# Patient Record
Sex: MALE | Race: WHITE | HISPANIC OR LATINO | Employment: UNEMPLOYED | URBAN - METROPOLITAN AREA
[De-identification: names, ages, dates, MRNs, and addresses within clinical notes are randomized per-mention and may not be internally consistent; named-entity substitution may affect disease eponyms.]

---

## 2022-01-13 ENCOUNTER — TELEPHONE (OUTPATIENT)
Dept: CT IMAGING | Facility: HOSPITAL | Age: 32
End: 2022-01-13

## 2022-01-13 ENCOUNTER — HOSPITAL ENCOUNTER (EMERGENCY)
Facility: HOSPITAL | Age: 32
Discharge: HOME | End: 2022-01-13
Attending: EMERGENCY MEDICINE
Payer: COMMERCIAL

## 2022-01-13 ENCOUNTER — HOSPITAL ENCOUNTER (EMERGENCY)
Facility: HOSPITAL | Age: 32
Discharge: HOME | End: 2022-01-14
Attending: EMERGENCY MEDICINE
Payer: COMMERCIAL

## 2022-01-13 ENCOUNTER — APPOINTMENT (EMERGENCY)
Dept: CT IMAGING | Facility: HOSPITAL | Age: 32
End: 2022-01-13
Payer: COMMERCIAL

## 2022-01-13 VITALS
HEIGHT: 69 IN | HEART RATE: 78 BPM | RESPIRATION RATE: 16 BRPM | WEIGHT: 160 LBS | BODY MASS INDEX: 23.7 KG/M2 | OXYGEN SATURATION: 98 % | TEMPERATURE: 97.8 F | SYSTOLIC BLOOD PRESSURE: 119 MMHG | DIASTOLIC BLOOD PRESSURE: 65 MMHG

## 2022-01-13 VITALS
RESPIRATION RATE: 22 BRPM | HEART RATE: 76 BPM | DIASTOLIC BLOOD PRESSURE: 61 MMHG | SYSTOLIC BLOOD PRESSURE: 105 MMHG | OXYGEN SATURATION: 99 % | TEMPERATURE: 97.7 F

## 2022-01-13 DIAGNOSIS — R11.0 NAUSEA: Primary | ICD-10-CM

## 2022-01-13 DIAGNOSIS — F12.90 CANNABINOID HYPEREMESIS SYNDROME: ICD-10-CM

## 2022-01-13 DIAGNOSIS — R11.2 CANNABINOID HYPEREMESIS SYNDROME: ICD-10-CM

## 2022-01-13 DIAGNOSIS — R11.2 NAUSEA AND VOMITING, INTRACTABILITY OF VOMITING NOT SPECIFIED, UNSPECIFIED VOMITING TYPE: Primary | ICD-10-CM

## 2022-01-13 LAB
ALBUMIN SERPL BCP-MCNC: 4.4 G/DL (ref 3.5–5)
ALBUMIN SERPL BCP-MCNC: 4.6 G/DL (ref 3.5–5)
ALP SERPL-CCNC: 60 U/L (ref 46–116)
ALP SERPL-CCNC: 64 U/L (ref 46–116)
ALT SERPL W P-5'-P-CCNC: 22 U/L (ref 12–78)
ALT SERPL W P-5'-P-CCNC: 25 U/L (ref 12–78)
ANION GAP SERPL CALCULATED.3IONS-SCNC: 14 MMOL/L (ref 4–13)
ANION GAP SERPL CALCULATED.3IONS-SCNC: 6 MMOL/L (ref 4–13)
AST SERPL W P-5'-P-CCNC: 22 U/L (ref 5–45)
AST SERPL W P-5'-P-CCNC: 27 U/L (ref 5–45)
BACTERIA UR QL AUTO: NORMAL /HPF
BASOPHILS # BLD AUTO: 0.02 THOUSANDS/ΜL (ref 0–0.1)
BASOPHILS # BLD AUTO: 0.05 THOUSANDS/ΜL (ref 0–0.1)
BASOPHILS NFR BLD AUTO: 0 % (ref 0–1)
BASOPHILS NFR BLD AUTO: 1 % (ref 0–1)
BILIRUB SERPL-MCNC: 0.92 MG/DL (ref 0.2–1)
BILIRUB SERPL-MCNC: 1.2 MG/DL (ref 0.2–1)
BILIRUB UR QL STRIP: NEGATIVE
BUN SERPL-MCNC: 11 MG/DL (ref 5–25)
BUN SERPL-MCNC: 14 MG/DL (ref 5–25)
CALCIUM SERPL-MCNC: 10.1 MG/DL (ref 8.3–10.1)
CALCIUM SERPL-MCNC: 9.9 MG/DL (ref 8.3–10.1)
CHLORIDE SERPL-SCNC: 102 MMOL/L (ref 100–108)
CHLORIDE SERPL-SCNC: 107 MMOL/L (ref 100–108)
CLARITY UR: CLEAR
CO2 SERPL-SCNC: 20 MMOL/L (ref 21–32)
CO2 SERPL-SCNC: 24 MMOL/L (ref 21–32)
COLOR UR: YELLOW
CREAT SERPL-MCNC: 0.98 MG/DL (ref 0.6–1.3)
CREAT SERPL-MCNC: 1.14 MG/DL (ref 0.6–1.3)
EOSINOPHIL # BLD AUTO: 0 THOUSAND/ΜL (ref 0–0.61)
EOSINOPHIL # BLD AUTO: 0.16 THOUSAND/ΜL (ref 0–0.61)
EOSINOPHIL NFR BLD AUTO: 0 % (ref 0–6)
EOSINOPHIL NFR BLD AUTO: 2 % (ref 0–6)
ERYTHROCYTE [DISTWIDTH] IN BLOOD BY AUTOMATED COUNT: 11.7 % (ref 11.6–15.1)
ERYTHROCYTE [DISTWIDTH] IN BLOOD BY AUTOMATED COUNT: 11.8 % (ref 11.6–15.1)
FLUAV RNA RESP QL NAA+PROBE: NEGATIVE
FLUBV RNA RESP QL NAA+PROBE: NEGATIVE
GFR SERPL CREATININE-BSD FRML MDRD: 103 ML/MIN/1.73SQ M
GFR SERPL CREATININE-BSD FRML MDRD: 86 ML/MIN/1.73SQ M
GLUCOSE SERPL-MCNC: 113 MG/DL (ref 65–140)
GLUCOSE SERPL-MCNC: 99 MG/DL (ref 65–140)
GLUCOSE UR STRIP-MCNC: NEGATIVE MG/DL
HCT VFR BLD AUTO: 44.9 % (ref 36.5–49.3)
HCT VFR BLD AUTO: 45.7 % (ref 36.5–49.3)
HGB BLD-MCNC: 16.3 G/DL (ref 12–17)
HGB BLD-MCNC: 16.8 G/DL (ref 12–17)
HGB UR QL STRIP.AUTO: NEGATIVE
HOLD SPECIMEN: NORMAL
HYALINE CASTS #/AREA URNS LPF: NORMAL /LPF
IMM GRANULOCYTES # BLD AUTO: 0.03 THOUSAND/UL (ref 0–0.2)
IMM GRANULOCYTES # BLD AUTO: 0.03 THOUSAND/UL (ref 0–0.2)
IMM GRANULOCYTES NFR BLD AUTO: 0 % (ref 0–2)
IMM GRANULOCYTES NFR BLD AUTO: 0 % (ref 0–2)
KETONES UR STRIP-MCNC: ABNORMAL MG/DL
LEUKOCYTE ESTERASE UR QL STRIP: NEGATIVE
LIPASE SERPL-CCNC: 53 U/L (ref 73–393)
LIPASE SERPL-CCNC: 55 U/L (ref 73–393)
LYMPHOCYTES # BLD AUTO: 1.83 THOUSANDS/ΜL (ref 0.6–4.47)
LYMPHOCYTES # BLD AUTO: 2.25 THOUSANDS/ΜL (ref 0.6–4.47)
LYMPHOCYTES NFR BLD AUTO: 17 % (ref 14–44)
LYMPHOCYTES NFR BLD AUTO: 23 % (ref 14–44)
MCH RBC QN AUTO: 31.2 PG (ref 26.8–34.3)
MCH RBC QN AUTO: 31.5 PG (ref 26.8–34.3)
MCHC RBC AUTO-ENTMCNC: 36.3 G/DL (ref 31.4–37.4)
MCHC RBC AUTO-ENTMCNC: 36.8 G/DL (ref 31.4–37.4)
MCV RBC AUTO: 86 FL (ref 82–98)
MCV RBC AUTO: 86 FL (ref 82–98)
MONOCYTES # BLD AUTO: 0.42 THOUSAND/ΜL (ref 0.17–1.22)
MONOCYTES # BLD AUTO: 0.68 THOUSAND/ΜL (ref 0.17–1.22)
MONOCYTES NFR BLD AUTO: 4 % (ref 4–12)
MONOCYTES NFR BLD AUTO: 7 % (ref 4–12)
NEUTROPHILS # BLD AUTO: 6.47 THOUSANDS/ΜL (ref 1.85–7.62)
NEUTROPHILS # BLD AUTO: 8.63 THOUSANDS/ΜL (ref 1.85–7.62)
NEUTS SEG NFR BLD AUTO: 67 % (ref 43–75)
NEUTS SEG NFR BLD AUTO: 79 % (ref 43–75)
NITRITE UR QL STRIP: NEGATIVE
NON-SQ EPI CELLS URNS QL MICRO: NORMAL /HPF
NRBC BLD AUTO-RTO: 0 /100 WBCS
NRBC BLD AUTO-RTO: 0 /100 WBCS
PH UR STRIP.AUTO: 8.5 [PH] (ref 4.5–8)
PLATELET # BLD AUTO: 210 THOUSANDS/UL (ref 149–390)
PLATELET # BLD AUTO: 221 THOUSANDS/UL (ref 149–390)
PMV BLD AUTO: 10.7 FL (ref 8.9–12.7)
PMV BLD AUTO: 9.7 FL (ref 8.9–12.7)
POTASSIUM SERPL-SCNC: 3.6 MMOL/L (ref 3.5–5.3)
POTASSIUM SERPL-SCNC: 3.9 MMOL/L (ref 3.5–5.3)
PROT SERPL-MCNC: 8.5 G/DL (ref 6.4–8.2)
PROT SERPL-MCNC: 8.6 G/DL (ref 6.4–8.2)
PROT UR STRIP-MCNC: ABNORMAL MG/DL
RBC # BLD AUTO: 5.22 MILLION/UL (ref 3.88–5.62)
RBC # BLD AUTO: 5.33 MILLION/UL (ref 3.88–5.62)
RBC #/AREA URNS AUTO: NORMAL /HPF
RSV RNA RESP QL NAA+PROBE: NEGATIVE
SARS-COV-2 RNA RESP QL NAA+PROBE: NEGATIVE
SODIUM SERPL-SCNC: 136 MMOL/L (ref 136–145)
SODIUM SERPL-SCNC: 137 MMOL/L (ref 136–145)
SP GR UR STRIP.AUTO: 1.01 (ref 1–1.03)
UROBILINOGEN UR QL STRIP.AUTO: 0.2 E.U./DL
WBC # BLD AUTO: 10.93 THOUSAND/UL (ref 4.31–10.16)
WBC # BLD AUTO: 9.64 THOUSAND/UL (ref 4.31–10.16)
WBC #/AREA URNS AUTO: NORMAL /HPF

## 2022-01-13 PROCEDURE — 85025 COMPLETE CBC W/AUTO DIFF WBC: CPT | Performed by: EMERGENCY MEDICINE

## 2022-01-13 PROCEDURE — 74177 CT ABD & PELVIS W/CONTRAST: CPT

## 2022-01-13 PROCEDURE — 83690 ASSAY OF LIPASE: CPT | Performed by: EMERGENCY MEDICINE

## 2022-01-13 PROCEDURE — 36415 COLL VENOUS BLD VENIPUNCTURE: CPT | Performed by: EMERGENCY MEDICINE

## 2022-01-13 PROCEDURE — 96374 THER/PROPH/DIAG INJ IV PUSH: CPT

## 2022-01-13 PROCEDURE — 96375 TX/PRO/DX INJ NEW DRUG ADDON: CPT

## 2022-01-13 PROCEDURE — 96365 THER/PROPH/DIAG IV INF INIT: CPT

## 2022-01-13 PROCEDURE — 99284 EMERGENCY DEPT VISIT MOD MDM: CPT

## 2022-01-13 PROCEDURE — G1004 CDSM NDSC: HCPCS

## 2022-01-13 PROCEDURE — 80053 COMPREHEN METABOLIC PANEL: CPT | Performed by: EMERGENCY MEDICINE

## 2022-01-13 PROCEDURE — 0241U HB NFCT DS VIR RESP RNA 4 TRGT: CPT | Performed by: PHYSICIAN ASSISTANT

## 2022-01-13 PROCEDURE — 99284 EMERGENCY DEPT VISIT MOD MDM: CPT | Performed by: PHYSICIAN ASSISTANT

## 2022-01-13 PROCEDURE — 99284 EMERGENCY DEPT VISIT MOD MDM: CPT | Performed by: EMERGENCY MEDICINE

## 2022-01-13 PROCEDURE — 81001 URINALYSIS AUTO W/SCOPE: CPT

## 2022-01-13 PROCEDURE — 96361 HYDRATE IV INFUSION ADD-ON: CPT

## 2022-01-13 RX ORDER — ONDANSETRON 2 MG/ML
4 INJECTION INTRAMUSCULAR; INTRAVENOUS ONCE
Status: COMPLETED | OUTPATIENT
Start: 2022-01-13 | End: 2022-01-13

## 2022-01-13 RX ORDER — HALOPERIDOL 5 MG/ML
2 INJECTION INTRAMUSCULAR ONCE
Status: DISCONTINUED | OUTPATIENT
Start: 2022-01-13 | End: 2022-01-13

## 2022-01-13 RX ORDER — ONDANSETRON 4 MG/1
4 TABLET, ORALLY DISINTEGRATING ORAL ONCE
Status: COMPLETED | OUTPATIENT
Start: 2022-01-13 | End: 2022-01-13

## 2022-01-13 RX ORDER — METOCLOPRAMIDE HYDROCHLORIDE 5 MG/ML
10 INJECTION INTRAMUSCULAR; INTRAVENOUS ONCE
Status: COMPLETED | OUTPATIENT
Start: 2022-01-13 | End: 2022-01-13

## 2022-01-13 RX ORDER — HALOPERIDOL 5 MG/ML
2 INJECTION INTRAMUSCULAR ONCE
Status: COMPLETED | OUTPATIENT
Start: 2022-01-13 | End: 2022-01-13

## 2022-01-13 RX ORDER — DEXTROSE MONOHYDRATE 50 MG/ML
1000 INJECTION, SOLUTION INTRAVENOUS CONTINUOUS
Status: DISCONTINUED | OUTPATIENT
Start: 2022-01-13 | End: 2022-01-13

## 2022-01-13 RX ORDER — ONDANSETRON 4 MG/1
4 TABLET, FILM COATED ORAL EVERY 6 HOURS
Qty: 12 TABLET | Refills: 0 | Status: SHIPPED | OUTPATIENT
Start: 2022-01-13

## 2022-01-13 RX ORDER — CALCIUM CARBONATE 200(500)MG
500 TABLET,CHEWABLE ORAL DAILY PRN
Status: DISCONTINUED | OUTPATIENT
Start: 2022-01-13 | End: 2022-01-13 | Stop reason: HOSPADM

## 2022-01-13 RX ORDER — LIDOCAINE HYDROCHLORIDE 20 MG/ML
15 SOLUTION OROPHARYNGEAL ONCE
Status: COMPLETED | OUTPATIENT
Start: 2022-01-13 | End: 2022-01-13

## 2022-01-13 RX ORDER — IBUPROFEN 400 MG/1
400 TABLET ORAL ONCE
Status: COMPLETED | OUTPATIENT
Start: 2022-01-13 | End: 2022-01-13

## 2022-01-13 RX ORDER — MAGNESIUM HYDROXIDE/ALUMINUM HYDROXICE/SIMETHICONE 120; 1200; 1200 MG/30ML; MG/30ML; MG/30ML
30 SUSPENSION ORAL ONCE
Status: COMPLETED | OUTPATIENT
Start: 2022-01-13 | End: 2022-01-13

## 2022-01-13 RX ADMIN — IOHEXOL 100 ML: 350 INJECTION, SOLUTION INTRAVENOUS at 22:13

## 2022-01-13 RX ADMIN — SODIUM CHLORIDE 1000 ML: 0.9 INJECTION, SOLUTION INTRAVENOUS at 07:01

## 2022-01-13 RX ADMIN — DEXTROSE AND SODIUM CHLORIDE 1000 ML: 5; .9 INJECTION, SOLUTION INTRAVENOUS at 07:42

## 2022-01-13 RX ADMIN — LIDOCAINE HYDROCHLORIDE 15 ML: 20 SOLUTION ORAL; TOPICAL at 07:31

## 2022-01-13 RX ADMIN — METOCLOPRAMIDE HYDROCHLORIDE 10 MG: 5 INJECTION INTRAMUSCULAR; INTRAVENOUS at 21:42

## 2022-01-13 RX ADMIN — ALUMINA, MAGNESIA, AND SIMETHICONE ORAL SUSPENSION REGULAR STRENGTH 30 ML: 1200; 1200; 120 SUSPENSION ORAL at 07:31

## 2022-01-13 RX ADMIN — ONDANSETRON 4 MG: 4 TABLET, ORALLY DISINTEGRATING ORAL at 19:55

## 2022-01-13 RX ADMIN — SODIUM CHLORIDE 1000 ML: 0.9 INJECTION, SOLUTION INTRAVENOUS at 21:38

## 2022-01-13 RX ADMIN — IBUPROFEN 400 MG: 400 TABLET, FILM COATED ORAL at 19:55

## 2022-01-13 RX ADMIN — ONDANSETRON 4 MG: 2 INJECTION INTRAMUSCULAR; INTRAVENOUS at 23:46

## 2022-01-13 RX ADMIN — HALOPERIDOL LACTATE 2 MG: 5 INJECTION, SOLUTION INTRAMUSCULAR at 07:00

## 2022-01-13 RX ADMIN — FAMOTIDINE 20 MG: 10 INJECTION INTRAVENOUS at 07:41

## 2022-01-13 NOTE — ED NOTES
Pt given crackers and water by resident  Pt began vomiting and dry heaving again  Pt advised to stop eating  Resident updated  Advised same  No new orders       Soniya Palacios RN  01/13/22 0676

## 2022-01-13 NOTE — DISCHARGE INSTRUCTIONS
Stop smoking marijuana  Take the Zofran for your nausea  You can also take hot showers and use capsicin cream      Use Tylenol to treat your abdominal pain

## 2022-01-13 NOTE — ED PROVIDER NOTES
History  Chief Complaint   Patient presents with    Abdominal Pain     pt states that he has abdominal pain since yesterday, has not been taking anything down; per EMS they said patient states he has alot going on in his personal life since pau and has not been eating, drinking, or sleeping     Ashkan Granados is a 36year-old man with a medical history significant for bipolar disorder presenting with abdominal pain, nausea, vomiting  These symptoms started 3-4 days ago and then severely worsened this morning  He has not eaten or drank much over the last few days  He smokes marijuana on a daily basis  No other recreational drugs, alcohol, or new medications  Has not taken anything for his symptoms at home  No blood in stool, melena, diarrhea, blood in vomit, fevers, chills, cough, congestion, rhinorrhea, chest pain, shortness of breath, history of intraabdominal surgeries, history of nephrolithiasis  None       Past Medical History:   Diagnosis Date    Asthma     Bipolar disorder ([facility] [address] )        Past Surgical History:   Procedure Laterality Date    APPENDECTOMY      TONSILLECTOMY         History reviewed  No pertinent family history  I have reviewed and agree with the history as documented  E-Cigarette/Vaping     E-Cigarette/Vaping Substances     Social History     Tobacco Use    Smoking status: Current Every Day Smoker     Packs/day: 0 25     Types: Cigarettes    Smokeless tobacco: Never Used   Substance Use Topics    Alcohol use: Yes    Drug use: Yes     Types: Marijuana        Review of Systems   All other systems reviewed and are negative        Physical Exam  ED Triage Vitals [01/13/22 0635]   Temperature Pulse Respirations Blood Pressure SpO2   97 7 °F (36 5 °C) 69 22 151/77 99 %      Temp Source Heart Rate Source Patient Position - Orthostatic VS BP Location FiO2 (%)   Oral Monitor Lying Left arm --      Pain Score       --             Orthostatic Vital Signs  Vitals:    01/13/22 0800 01/13/22 0815 01/13/22 0830 01/13/22 0900   BP: 121/78  126/82 105/61   Pulse: 64 76     Patient Position - Orthostatic VS:           Physical Exam  Vitals and nursing note reviewed  Constitutional:       General: He is not in acute distress  Appearance: He is ill-appearing and diaphoretic  HENT:      Head: Normocephalic and atraumatic  Right Ear: External ear normal       Left Ear: External ear normal       Mouth/Throat:      Mouth: Mucous membranes are moist    Eyes:      General: No scleral icterus  Cardiovascular:      Rate and Rhythm: Normal rate and regular rhythm  Pulmonary:      Effort: Pulmonary effort is normal       Breath sounds: Normal breath sounds  Abdominal:      General: There is no distension  Palpations: Abdomen is soft  Tenderness: There is generalized abdominal tenderness  There is no right CVA tenderness, left CVA tenderness or guarding  Negative signs include Marinelli's sign  Musculoskeletal:         General: No deformity  Skin:     General: Skin is warm  Capillary Refill: Capillary refill takes less than 2 seconds  Coloration: Skin is not jaundiced or pale  Neurological:      General: No focal deficit present  Mental Status: He is alert and oriented to person, place, and time           ED Medications  Medications   haloperidol lactate (HALDOL) injection 2 mg (2 mg Intravenous Given 1/13/22 0700)   aluminum-magnesium hydroxide-simethicone (MYLANTA) oral suspension 30 mL (30 mL Oral Given 1/13/22 0731)   Lidocaine Viscous HCl (XYLOCAINE) 2 % mucosal solution 15 mL (15 mL Swish & Spit Given 1/13/22 0731)   famotidine (PEPCID) injection 20 mg (20 mg Intravenous Given 1/13/22 0741)   dextrose 5 % and sodium chloride 0 9 % bolus 1,000 mL (0 mL Intravenous Stopped 1/13/22 0900)       Diagnostic Studies  Results Reviewed     Procedure Component Value Units Date/Time    Urine Microscopic [[id number]]  (Normal) Collected: 01/13/22 0737    Lab Status: Final result Specimen: Urine, Clean Catch Updated: 01/13/22 0855     RBC, UA None Seen /hpf      WBC, UA None Seen /hpf      Epithelial Cells None Seen /hpf      Bacteria, UA None Seen /hpf      Hyaline Casts, UA None Seen /lpf     Comprehensive metabolic panel [[id number]]  (Abnormal) Collected: 01/13/22 0731    Lab Status: Final result Specimen: Blood from Arm, Right Updated: 01/13/22 0804     Sodium 137 mmol/L      Potassium 3 6 mmol/L      Chloride 107 mmol/L      CO2 24 mmol/L      ANION GAP 6 mmol/L      BUN 14 mg/dL      Creatinine 0 98 mg/dL      Glucose 99 mg/dL      Calcium 9 9 mg/dL      AST 22 U/L      ALT 25 U/L      Alkaline Phosphatase 60 U/L      Total Protein 8 6 g/dL      Albumin 4 4 g/dL      Total Bilirubin 0 92 mg/dL      eGFR 103 ml/min/1 73sq m     Narrative:      [place] guidelines for Chronic Kidney Disease (CKD):     Stage 1 with normal or high GFR (GFR > 90 mL/min/1 73 square meters)    Stage 2 Mild CKD (GFR = 60-89 mL/min/1 73 square meters)    Stage 3A Moderate CKD (GFR = 45-59 mL/min/1 73 square meters)    Stage 3B Moderate CKD (GFR = 30-44 mL/min/1 73 square meters)    Stage 4 Severe CKD (GFR = 15-29 mL/min/1 73 square meters)    Stage 5 End Stage CKD (GFR <15 mL/min/1 73 square meters)  Note: GFR calculation is accurate only with a steady state creatinine    Lipase [[id number]]  (Abnormal) Collected: 01/13/22 0731    Lab Status: Final result Specimen: Blood from Arm, Right Updated: 01/13/22 0804     Lipase 53 u/L     CBC and differential [[id number]] Collected: 01/13/22 0731    Lab Status: Final result Specimen: Blood from Arm, Right Updated: 01/13/22 0743     WBC 9 64 Thousand/uL      RBC 5 22 Million/uL      Hemoglobin 16 3 g/dL      Hematocrit 44 9 %      MCV 86 fL      MCH 31 2 pg      MCHC 36 3 g/dL      RDW 11 8 %      MPV 10 7 fL      Platelets 951 Thousands/uL      nRBC 0 /100 WBCs      Neutrophils Relative 67 %      Immat GRANS % 0 % Lymphocytes Relative 23 %      Monocytes Relative 7 %      Eosinophils Relative 2 %      Basophils Relative 1 %      Neutrophils Absolute 6 47 Thousands/µL      Immature Grans Absolute 0 03 Thousand/uL      Lymphocytes Absolute 2 25 Thousands/µL      Monocytes Absolute 0 68 Thousand/µL      Eosinophils Absolute 0 16 Thousand/µL      Basophils Absolute 0 05 Thousands/µL     Urine Macroscopic, POC [[id number]]  (Abnormal) Collected: 01/13/22 0737    Lab Status: Final result Specimen: Urine Updated: 01/13/22 0739     Color, UA Yellow     Clarity, UA Clear     pH, UA 8 5     Leukocytes, UA Negative     Nitrite, UA Negative     Protein, UA 30 (1+) mg/dl      Glucose, UA Negative mg/dl      Ketones, UA 15 (1+) mg/dl      Urobilinogen, UA 0 2 E U /dl      Bilirubin, UA Negative     Blood, UA Negative     Specific Gravity, UA 1 015    Narrative:      [facility] RESULT                 No orders to display         Procedures  Procedures      ED Course                             SBIRT 22yo+      Most Recent Value   SBIRT (24 yo +)    In order to provide better care to our patients, we are screening all of our patients for alcohol and drug use  Would it be okay to ask you these screening questions? Unable to answer at this time Filed at: 01/13/2022 1646                [facility]  Number of Diagnoses or Management Options  Cannabinoid hyperemesis syndrome  Nausea  Diagnosis management comments: 37 yo man presenting with vomiting  Most concerning for hyperemesis due to cannabis  Also concerning for cholecystitis, pancreatitis, gastritis, gastric ulcer, nephrolithiasis  Will evaluate with CBC, CMP, lipase, UA  Will treat with IV D5 NS bolus, haldol, tums, mylanta, viscous lidocaine, pepcid  Symptoms improved in the ED  Lab work unremarkable  Most likely hyperemesis due to cannabis  Counseled on marijuana cessation  Discharged home, to follow up with PCP         Disposition  Final diagnoses:   Nausea   Cannabinoid hyperemesis syndrome Time reflects when diagnosis was documented in both MDM as applicable and the Disposition within this note     Time User Action Codes Description Comment    1/13/2022  8:21 AM Ben Davidson Add [R11 0] Nausea     1/13/2022  8:23 AM Ben Davidson Add [R11 2,  F12 90] Cannabinoid hyperemesis syndrome       ED Disposition     ED Disposition Condition Date/Time Comment    Discharge Stable Thu Jan 13, 2022  9:20 AM Collette Ket discharge to home/self care  Follow-up Information     Follow up With Specialties Details Why Contact Info Additional Information    [place] In 1 week  [address] [id number]  [id number]  , [place], [place], [address]          Discharge Medication List as of 1/13/2022  9:20 AM      START taking these medications    Details   ondansetron (ZOFRAN) 4 mg tablet Take 1 tablet (4 mg total) by mouth every 6 (six) hours, Starting Thu 1/13/2022, Normal           No discharge procedures on file  PDMP Review     None           ED Provider  Attending physically available and evaluated Collette Ket  I managed the patient along with the ED Attending      Electronically Signed by         Delfino Hairston MD  01/30/22 6648

## 2022-01-13 NOTE — ED ATTENDING ATTESTATION
1/13/2022  IYefri MD, saw and evaluated the patient  I have discussed the patient with the resident/non-physician practitioner and agree with the resident's/non-physician practitioner's findings, Plan of Care, and MDM as documented in the resident's/non-physician practitioner's note, except where noted  All available labs and Radiology studies were reviewed  I was present for key portions of any procedure(s) performed by the resident/non-physician practitioner and I was immediately available to provide assistance  At this point I agree with the current assessment done in the Emergency Department    I have conducted an independent evaluation of this patient a history and physical is as follows:  abd pain with nausea and vomiting  Non bilious non bloody  No diarrhea   No melena no urine  Symptoms   No fever   No back pain   Exam  nad anicteric    Lungs clear heart rrr no m abd soft  nd  Pos bs  Mild diffuse tenderness  Ext normal     ED Course         Critical Care Time  Procedures

## 2022-01-13 NOTE — Clinical Note
Meir Beti was seen and treated in our emergency department on 1/13/2022  Diagnosis:     Ron Conrad  may return to work on return date  He may return on this date: 01/15/2022         If you have any questions or concerns, please don't hesitate to call        Patrick Figueroa MD    ______________________________           _______________          _______________  Hospital Representative                              Date                                Time

## 2022-01-14 LAB
BACTERIA UR QL AUTO: NORMAL /HPF
BILIRUB UR QL STRIP: NEGATIVE
CLARITY UR: CLEAR
COLOR UR: YELLOW
GLUCOSE UR STRIP-MCNC: NEGATIVE MG/DL
HGB UR QL STRIP.AUTO: ABNORMAL
KETONES UR STRIP-MCNC: ABNORMAL MG/DL
LEUKOCYTE ESTERASE UR QL STRIP: NEGATIVE
NITRITE UR QL STRIP: NEGATIVE
NON-SQ EPI CELLS URNS QL MICRO: NORMAL /HPF
PH UR STRIP.AUTO: 8.5 [PH]
PROT UR STRIP-MCNC: NEGATIVE MG/DL
RBC #/AREA URNS AUTO: NORMAL /HPF
SP GR UR STRIP.AUTO: 1.01 (ref 1–1.03)
UROBILINOGEN UR QL STRIP.AUTO: 0.2 E.U./DL
WBC #/AREA URNS AUTO: NORMAL /HPF

## 2022-01-14 PROCEDURE — 81001 URINALYSIS AUTO W/SCOPE: CPT | Performed by: PHYSICIAN ASSISTANT

## 2022-01-14 RX ORDER — METOCLOPRAMIDE 10 MG/1
10 TABLET ORAL EVERY 6 HOURS
Qty: 15 TABLET | Refills: 0 | Status: SHIPPED | OUTPATIENT
Start: 2022-01-14

## 2022-01-14 NOTE — ED PROVIDER NOTES
History  Chief Complaint   Patient presents with    Abdominal Pain     Patient comes in to ED w/ c/o abd pain and vomiting x2 days  Patient was seen at [facility] ED this morning and prescribed zofran, reports no relief   Vomiting     Patient is a 74-year-old male presenting to the emergency room for evaluation of vomiting for the last 3 days  Patient was seen in another emergency room earlier in the day and was discharged home with diagnosis of hyperemesis cannabinoid  Patient states he got home and he began vomiting again which prompted him to come to this emergency room  Patient denies any fever, chills, chest pain, shortness of breath, dysuria, testicular pain, diarrhea, headache, dizziness  Patient states he does smoke marijuana daily  History provided by:  Patient   used: No    Vomiting  Associated symptoms: no abdominal pain, no arthralgias, no chills, no cough, no fever and no sore throat        Prior to Admission Medications   Prescriptions Last Dose Informant Patient Reported? Taking?   ondansetron (ZOFRAN) 4 mg tablet   No No   Sig: Take 1 tablet (4 mg total) by mouth every 6 (six) hours      Facility-Administered Medications: None       Past Medical History:   Diagnosis Date    Asthma     Bipolar disorder ([facility] [address] )        Past Surgical History:   Procedure Laterality Date    APPENDECTOMY      TONSILLECTOMY         History reviewed  No pertinent family history  I have reviewed and agree with the history as documented  E-Cigarette/Vaping     E-Cigarette/Vaping Substances     Social History     Tobacco Use    Smoking status: Current Every Day Smoker     Packs/day: 0 25     Types: Cigarettes    Smokeless tobacco: Never Used   Substance Use Topics    Alcohol use: Yes    Drug use: Yes     Types: Marijuana       Review of Systems   Constitutional: Negative for chills and fever  HENT: Negative for ear pain and sore throat      Eyes: Negative for pain and visual disturbance  Respiratory: Negative for cough and shortness of breath  Cardiovascular: Negative for chest pain and palpitations  Gastrointestinal: Positive for nausea and vomiting  Negative for abdominal pain  Genitourinary: Negative for dysuria and hematuria  Musculoskeletal: Negative for arthralgias and back pain  Skin: Negative for color change and rash  Neurological: Negative for seizures and syncope  All other systems reviewed and are negative  Physical Exam  Physical Exam  Vitals and nursing note reviewed  Constitutional:       Appearance: He is well-developed  HENT:      Head: Normocephalic and atraumatic  Eyes:      Conjunctiva/sclera: Conjunctivae normal    Cardiovascular:      Rate and Rhythm: Normal rate and regular rhythm  Heart sounds: No murmur heard  Pulmonary:      Effort: Pulmonary effort is normal  No respiratory distress  Breath sounds: Normal breath sounds  Abdominal:      Palpations: Abdomen is soft  Tenderness: There is generalized abdominal tenderness  Musculoskeletal:      Cervical back: Neck supple  Skin:     General: Skin is warm and dry  Neurological:      Mental Status: He is alert           Vital Signs  ED Triage Vitals [01/13/22 1948]   Temperature Pulse Respirations Blood Pressure SpO2   97 8 °F (36 6 °C) 100 20 130/67 100 %      Temp Source Heart Rate Source Patient Position - Orthostatic VS BP Location FiO2 (%)   Oral Monitor Sitting Left arm --      Pain Score       8           Vitals:    01/13/22 1948 01/13/22 2350   BP: 130/67 119/65   Pulse: 100 78   Patient Position - Orthostatic VS: Sitting        ED Medications  Medications   ondansetron (ZOFRAN-ODT) dispersible tablet 4 mg (4 mg Oral Given 1/13/22 1955)   ibuprofen (MOTRIN) tablet 400 mg (400 mg Oral Given 1/13/22 1955)   sodium chloride 0 9 % bolus 1,000 mL (0 mL Intravenous Stopped 1/14/22 0050)   metoclopramide (REGLAN) injection 10 mg (10 mg Intravenous Given 1/13/22 2142)   iohexol (OMNIPAQUE) 350 MG/ML injection (SINGLE-DOSE) 100 mL (100 mL Intravenous Given 1/13/22 2213)   ondansetron (ZOFRAN) injection 4 mg (4 mg Intravenous Given 1/13/22 2346)       Diagnostic Studies  Results Reviewed     Procedure Component Value Units Date/Time    Urine Microscopic [[id number]]  (Normal) Collected: 01/14/22 0004    Lab Status: Final result Specimen: Urine Updated: 01/14/22 0111     RBC, UA 1-2 /hpf      WBC, UA 0-1 /hpf      Epithelial Cells Occasional /hpf      Bacteria, UA None Seen /hpf     UA (URINE) with reflex to Scope [[id number]]  (Abnormal) Collected: 01/14/22 0004    Lab Status: Final result Specimen: Urine Updated: 01/14/22 0008     Color, UA Yellow     Clarity, UA Clear     Specific Gravity, UA 1 010     pH, UA 8 5     Leukocytes, UA Negative     Nitrite, UA Negative     Protein, UA Negative mg/dl      Glucose, UA Negative mg/dl      Ketones, UA 40 (2+) mg/dl      Urobilinogen, UA 0 2 E U /dl      Bilirubin, UA Negative     Blood, UA Trace-Intact    COVID/FLU/RSV [[id number]]  (Normal) Collected: 01/13/22 2138    Lab Status: Final result Specimen: Nares from Nose Updated: 01/13/22 2225     SARS-CoV-2 Negative     INFLUENZA A PCR Negative     INFLUENZA B PCR Negative     RSV PCR Negative    Narrative:      FOR PEDIATRIC PATIENTS - copy/paste COVID Guidelines URL to browser: [email or web address]  [employer]    SARS-CoV-2 assay is a Nucleic Acid Amplification assay intended for the  qualitative detection of nucleic acid from SARS-CoV-2 in nasopharyngeal  swabs  Results are for the presumptive identification of SARS-CoV-2 RNA  Positive results are indicative of infection with SARS-CoV-2, the virus  causing COVID-19, but do not rule out bacterial infection or co-infection  with other viruses  Laboratories within the [place] and its  territories are required to report all positive results to the appropriate  public health authorities  Negative results do not preclude SARS-CoV-2  infection and should not be used as the sole basis for treatment or other  patient management decisions  Negative results must be combined with  clinical observations, patient history, and epidemiological information  This test has not been FDA cleared or approved  This test has been authorized by FDA under an Emergency Use Authorization  (EUA)  This test is only authorized for the duration of time the  declaration that circumstances exist justifying the authorization of the  emergency use of an in vitro diagnostic tests for detection of SARS-CoV-2  virus and/or diagnosis of COVID-19 infection under section 564(b)(1) of  the Act, 21 U  S C  688XGG-4(P)(2), unless the authorization is terminated  or revoked sooner  The test has been validated but independent review by FDA  and CLIA is pending  Test performed using [employer] GeneXpert: This RT-PCR assay targets N2,  a region unique to SARS-CoV-2  A conserved region in the E-gene was chosen  for pan-Sarbecovirus detection which includes SARS-CoV-2  [place] draw [[id number]] Collected: 01/13/22 1957    Lab Status: In process Specimen: Blood from Arm, Right Updated: 01/13/22 2101    Narrative: The following orders were created for panel order [place] draw  Procedure                               Abnormality         Status                     ---------                               -----------         ------                     Nathanael Rod Top on UFSP[[id number]]                           Final result               Green / Black tube on BJUT[[id number]]                       Final result               Lavender Top 7ml on RPUX[[id number]]                         In process                   Please view results for these tests on the individual orders      Lipase [[id number]]  (Abnormal) Collected: 01/13/22 1955    Lab Status: Final result Specimen: Blood from Arm, Right Updated: 01/13/22 2038     Lipase 55 u/L Comprehensive metabolic panel [[id number]]  (Abnormal) Collected: 01/13/22 1955    Lab Status: Final result Specimen: Blood from Arm, Right Updated: 01/13/22 2038     Sodium 136 mmol/L      Potassium 3 9 mmol/L      Chloride 102 mmol/L      CO2 20 mmol/L      ANION GAP 14 mmol/L      BUN 11 mg/dL      Creatinine 1 14 mg/dL      Glucose 113 mg/dL      Calcium 10 1 mg/dL      AST 27 U/L      ALT 22 U/L      Alkaline Phosphatase 64 U/L      Total Protein 8 5 g/dL      Albumin 4 6 g/dL      Total Bilirubin 1 20 mg/dL      eGFR 86 ml/min/1 73sq m     Narrative:      National Kidney Disease Foundation guidelines for Chronic Kidney Disease (CKD):     Stage 1 with normal or high GFR (GFR > 90 mL/min/1 73 square meters)    Stage 2 Mild CKD (GFR = 60-89 mL/min/1 73 square meters)    Stage 3A Moderate CKD (GFR = 45-59 mL/min/1 73 square meters)    Stage 3B Moderate CKD (GFR = 30-44 mL/min/1 73 square meters)    Stage 4 Severe CKD (GFR = 15-29 mL/min/1 73 square meters)    Stage 5 End Stage CKD (GFR <15 mL/min/1 73 square meters)  Note: GFR calculation is accurate only with a steady state creatinine    CBC and differential [[id number]]  (Abnormal) Collected: 01/13/22 1955    Lab Status: Final result Specimen: Blood from Arm, Right Updated: 01/13/22 2003     WBC 10 93 Thousand/uL      RBC 5 33 Million/uL      Hemoglobin 16 8 g/dL      Hematocrit 45 7 %      MCV 86 fL      MCH 31 5 pg      MCHC 36 8 g/dL      RDW 11 7 %      MPV 9 7 fL      Platelets 546 Thousands/uL      nRBC 0 /100 WBCs      Neutrophils Relative 79 %      Immat GRANS % 0 %      Lymphocytes Relative 17 %      Monocytes Relative 4 %      Eosinophils Relative 0 %      Basophils Relative 0 %      Neutrophils Absolute 8 63 Thousands/µL      Immature Grans Absolute 0 03 Thousand/uL      Lymphocytes Absolute 1 83 Thousands/µL      Monocytes Absolute 0 42 Thousand/µL      Eosinophils Absolute 0 00 Thousand/µL      Basophils Absolute 0 02 Thousands/µL CT abdomen pelvis with contrast   Final Result by Vick Kumar MD (01/13 9961)      No evidence of acute abnormality  Workstation performed: [id number]                  ED Course  ED Course as of 01/14/22 0611   Geo Mendoza Jan 14, 2022   0030 Re-evaluation at bedside, patient feels better, would like to go home  Updated patient on labs and imaging results  Counseling as below  The appropriate recommended follow up and warning signs for return to the nearest ED were explained  patient's questions answered; patient is competent and reliable, verbalized understanding of all that was explained, and agrees with the disposition and further plan of treatment  patient was additionally provided with detailed follow up care instructions, phone numbers to call within our institution for other concerns or inquiries  Advised to follow up with PCP  Pt is in no acute distress and is stable for discharge at this time  MDM  Number of Diagnoses or Management Options  Nausea and vomiting, intractability of vomiting not specified, unspecified vomiting type  Diagnosis management comments: Patient is a 31-year-old male presenting to the emergency room for evaluation of vomiting for 3 days  Patient was seen in another emergency room earlier in the day and was discharged home  Patient states he does smoke marijuana daily  On exam patient's abdomen is diffusely tender, however it is mild  Patient CBCs revealing a minimal leukocytosis  Patient's CMP a CO2 of 20, and an anion gap of 14, likely related to dehydration  His sugars are within normal limits  Patient's kidney function is within normal limits  Patient's urinalysis positive for 2+ ketones, likely related to dehydration  Patient was given fluid boluses, Reglan, and Zofran in the emergency room  Patient is able to tolerate p o  Juice in the emergency room  Patient had a CT done which was unremarkable for any acute abnormalities    Patient was given a script for Reglan, he was advised return to the ER if anything acutely changes  Amount and/or Complexity of Data Reviewed  Clinical lab tests: ordered and reviewed  Tests in the radiology section of CPT®: ordered and reviewed    Patient Progress  Patient progress: stable      Disposition  Final diagnoses:   Nausea and vomiting, intractability of vomiting not specified, unspecified vomiting type     Time reflects when diagnosis was documented in both MDM as applicable and the Disposition within this note     Time User Action Codes Description Comment    1/14/2022 12:40 AM Kylah Sheets Add [R11 2] Nausea and vomiting, intractability of vomiting not specified, unspecified vomiting type       ED Disposition     ED Disposition Condition Date/Time Comment    Discharge Stable Fri Jan 14, 2022 12:40 AM Thersia Shown discharge to home/self care  Follow-up Information     Follow up With Specialties Details Why Contact Info Additional Information    [facility] Emergency Medicine  If symptoms worsen [address] Emergency Department, [address], [place], [place], [address]          Discharge Medication List as of 1/14/2022 12:41 AM      START taking these medications    Details   metoclopramide (Reglan) 10 mg tablet Take 1 tablet (10 mg total) by mouth every 6 (six) hours, Starting Fri 1/14/2022, Print         CONTINUE these medications which have NOT CHANGED    Details   ondansetron (ZOFRAN) 4 mg tablet Take 1 tablet (4 mg total) by mouth every 6 (six) hours, Starting Thu 1/13/2022, Normal             No discharge procedures on file      PDMP Review     None          ED Provider  Electronically Signed by           Orlando Coffey PA-C  01/14/22 8042

## 2022-08-13 ENCOUNTER — HOSPITAL ENCOUNTER (EMERGENCY)
Facility: HOSPITAL | Age: 32
Discharge: HOME | End: 2022-08-13
Attending: EMERGENCY MEDICINE
Payer: COMMERCIAL

## 2022-08-13 DIAGNOSIS — F12.10 MILD TETRAHYDROCANNABINOL (THC) ABUSE: ICD-10-CM

## 2022-08-13 DIAGNOSIS — R11.2 NAUSEA AND VOMITING, UNSPECIFIED VOMITING TYPE: Primary | ICD-10-CM

## 2022-08-13 LAB
ALBUMIN SERPL BCP-MCNC: 4.7 G/DL (ref 3.5–5)
ALP SERPL-CCNC: 63 U/L (ref 34–104)
ALT SERPL W P-5'-P-CCNC: 14 U/L (ref 7–52)
ANION GAP SERPL CALCULATED.3IONS-SCNC: 13 MMOL/L (ref 4–13)
AST SERPL W P-5'-P-CCNC: 16 U/L (ref 13–39)
BASOPHILS # BLD AUTO: 0.04 THOUSANDS/ΜL (ref 0–0.1)
BASOPHILS NFR BLD AUTO: 0 % (ref 0–1)
BILIRUB SERPL-MCNC: 1.37 MG/DL (ref 0.2–1)
BUN SERPL-MCNC: 13 MG/DL (ref 5–25)
CALCIUM SERPL-MCNC: 10.3 MG/DL (ref 8.4–10.2)
CHLORIDE SERPL-SCNC: 95 MMOL/L (ref 96–108)
CO2 SERPL-SCNC: 28 MMOL/L (ref 21–32)
CREAT SERPL-MCNC: 1.03 MG/DL (ref 0.6–1.3)
EOSINOPHIL # BLD AUTO: 0.02 THOUSAND/ΜL (ref 0–0.61)
EOSINOPHIL NFR BLD AUTO: 0 % (ref 0–6)
ERYTHROCYTE [DISTWIDTH] IN BLOOD BY AUTOMATED COUNT: 11.2 % (ref 11.6–15.1)
GFR SERPL CREATININE-BSD FRML MDRD: 97 ML/MIN/1.73SQ M
GLUCOSE SERPL-MCNC: 127 MG/DL (ref 65–140)
HCT VFR BLD AUTO: 45.6 % (ref 36.5–49.3)
HGB BLD-MCNC: 17 G/DL (ref 12–17)
IMM GRANULOCYTES # BLD AUTO: 0.03 THOUSAND/UL (ref 0–0.2)
IMM GRANULOCYTES NFR BLD AUTO: 0 % (ref 0–2)
LIPASE SERPL-CCNC: 16 U/L (ref 11–82)
LYMPHOCYTES # BLD AUTO: 1.51 THOUSANDS/ΜL (ref 0.6–4.47)
LYMPHOCYTES NFR BLD AUTO: 16 % (ref 14–44)
MCH RBC QN AUTO: 30.7 PG (ref 26.8–34.3)
MCHC RBC AUTO-ENTMCNC: 37.3 G/DL (ref 31.4–37.4)
MCV RBC AUTO: 83 FL (ref 82–98)
MONOCYTES # BLD AUTO: 0.78 THOUSAND/ΜL (ref 0.17–1.22)
MONOCYTES NFR BLD AUTO: 8 % (ref 4–12)
NEUTROPHILS # BLD AUTO: 7.16 THOUSANDS/ΜL (ref 1.85–7.62)
NEUTS SEG NFR BLD AUTO: 76 % (ref 43–75)
NRBC BLD AUTO-RTO: 0 /100 WBCS
PLATELET # BLD AUTO: 226 THOUSANDS/UL (ref 149–390)
PMV BLD AUTO: 9 FL (ref 8.9–12.7)
POTASSIUM SERPL-SCNC: 2.8 MMOL/L (ref 3.5–5.3)
PROT SERPL-MCNC: 8.3 G/DL (ref 6.4–8.4)
RBC # BLD AUTO: 5.53 MILLION/UL (ref 3.88–5.62)
SODIUM SERPL-SCNC: 136 MMOL/L (ref 135–147)
WBC # BLD AUTO: 9.54 THOUSAND/UL (ref 4.31–10.16)

## 2022-08-13 PROCEDURE — 96366 THER/PROPH/DIAG IV INF ADDON: CPT

## 2022-08-13 PROCEDURE — 83690 ASSAY OF LIPASE: CPT | Performed by: STUDENT IN AN ORGANIZED HEALTH CARE EDUCATION/TRAINING PROGRAM

## 2022-08-13 PROCEDURE — 85025 COMPLETE CBC W/AUTO DIFF WBC: CPT | Performed by: STUDENT IN AN ORGANIZED HEALTH CARE EDUCATION/TRAINING PROGRAM

## 2022-08-13 PROCEDURE — 96372 THER/PROPH/DIAG INJ SC/IM: CPT

## 2022-08-13 PROCEDURE — 36415 COLL VENOUS BLD VENIPUNCTURE: CPT | Performed by: STUDENT IN AN ORGANIZED HEALTH CARE EDUCATION/TRAINING PROGRAM

## 2022-08-13 PROCEDURE — 99284 EMERGENCY DEPT VISIT MOD MDM: CPT

## 2022-08-13 PROCEDURE — 96365 THER/PROPH/DIAG IV INF INIT: CPT

## 2022-08-13 PROCEDURE — 80053 COMPREHEN METABOLIC PANEL: CPT | Performed by: STUDENT IN AN ORGANIZED HEALTH CARE EDUCATION/TRAINING PROGRAM

## 2022-08-13 PROCEDURE — 99284 EMERGENCY DEPT VISIT MOD MDM: CPT | Performed by: STUDENT IN AN ORGANIZED HEALTH CARE EDUCATION/TRAINING PROGRAM

## 2022-08-13 PROCEDURE — 96375 TX/PRO/DX INJ NEW DRUG ADDON: CPT

## 2022-08-13 RX ORDER — POTASSIUM CHLORIDE 20 MEQ/1
40 TABLET, EXTENDED RELEASE ORAL ONCE
Status: COMPLETED | OUTPATIENT
Start: 2022-08-13 | End: 2022-08-13

## 2022-08-13 RX ORDER — OMEPRAZOLE 20 MG/1
20 CAPSULE, DELAYED RELEASE ORAL DAILY
COMMUNITY

## 2022-08-13 RX ORDER — ONDANSETRON 2 MG/ML
4 INJECTION INTRAMUSCULAR; INTRAVENOUS ONCE
Status: DISCONTINUED | OUTPATIENT
Start: 2022-08-13 | End: 2022-08-13 | Stop reason: HOSPADM

## 2022-08-13 RX ORDER — HALOPERIDOL 5 MG/ML
2 INJECTION INTRAMUSCULAR ONCE
Status: COMPLETED | OUTPATIENT
Start: 2022-08-13 | End: 2022-08-13

## 2022-08-13 RX ORDER — POTASSIUM CHLORIDE 29.8 MG/ML
40 INJECTION INTRAVENOUS ONCE
Status: DISCONTINUED | OUTPATIENT
Start: 2022-08-13 | End: 2022-08-13

## 2022-08-13 RX ORDER — ONDANSETRON 4 MG/1
4 TABLET, ORALLY DISINTEGRATING ORAL EVERY 6 HOURS PRN
Qty: 20 TABLET | Refills: 0 | Status: SHIPPED | OUTPATIENT
Start: 2022-08-13

## 2022-08-13 RX ORDER — POTASSIUM CHLORIDE 14.9 MG/ML
20 INJECTION INTRAVENOUS ONCE
Status: COMPLETED | OUTPATIENT
Start: 2022-08-13 | End: 2022-08-13

## 2022-08-13 RX ORDER — DIPHENHYDRAMINE HYDROCHLORIDE 50 MG/ML
12.5 INJECTION INTRAMUSCULAR; INTRAVENOUS ONCE
Status: COMPLETED | OUTPATIENT
Start: 2022-08-13 | End: 2022-08-13

## 2022-08-13 RX ORDER — METOCLOPRAMIDE HYDROCHLORIDE 5 MG/ML
10 INJECTION INTRAMUSCULAR; INTRAVENOUS ONCE
Status: COMPLETED | OUTPATIENT
Start: 2022-08-13 | End: 2022-08-13

## 2022-08-13 RX ORDER — ONDANSETRON 2 MG/ML
4 INJECTION INTRAMUSCULAR; INTRAVENOUS ONCE
Status: COMPLETED | OUTPATIENT
Start: 2022-08-13 | End: 2022-08-13

## 2022-08-13 RX ADMIN — METOCLOPRAMIDE 10 MG: 5 INJECTION, SOLUTION INTRAMUSCULAR; INTRAVENOUS at 06:26

## 2022-08-13 RX ADMIN — POTASSIUM CHLORIDE 20 MEQ: 14.9 INJECTION, SOLUTION INTRAVENOUS at 08:34

## 2022-08-13 RX ADMIN — ONDANSETRON 4 MG: 2 INJECTION INTRAMUSCULAR; INTRAVENOUS at 08:32

## 2022-08-13 RX ADMIN — POTASSIUM CHLORIDE 40 MEQ: 1500 TABLET, EXTENDED RELEASE ORAL at 11:10

## 2022-08-13 RX ADMIN — HALOPERIDOL LACTATE 2 MG: 5 INJECTION, SOLUTION INTRAMUSCULAR at 06:29

## 2022-08-13 RX ADMIN — DIPHENHYDRAMINE HYDROCHLORIDE 12.5 MG: 50 INJECTION, SOLUTION INTRAMUSCULAR; INTRAVENOUS at 06:27

## 2022-08-13 NOTE — DISCHARGE INSTRUCTIONS
FOLLOW up with [facility]  Take medications as prescribed  Labs Reviewed   CBC AND DIFFERENTIAL - Abnormal       Result Value Ref Range Status    WBC 9 54  4 31 - 10 16 Thousand/uL Final    RBC 5 53  3 88 - 5 62 Million/uL Final    Hemoglobin 17 0  12 0 - 17 0 g/dL Final    Hematocrit 45 6  36 5 - 49 3 % Final    MCV 83  82 - 98 fL Final    MCH 30 7  26 8 - 34 3 pg Final    MCHC 37 3  31 4 - 37 4 g/dL Final    RDW 11 2 (*) 11 6 - 15 1 % Final    MPV 9 0  8 9 - 12 7 fL Final    Platelets 702  735 - 390 Thousands/uL Final    nRBC 0  /100 WBCs Final    Neutrophils Relative 76 (*) 43 - 75 % Final    Immat GRANS % 0  0 - 2 % Final    Lymphocytes Relative 16  14 - 44 % Final    Monocytes Relative 8  4 - 12 % Final    Eosinophils Relative 0  0 - 6 % Final    Basophils Relative 0  0 - 1 % Final    Neutrophils Absolute 7 16  1 85 - 7 62 Thousands/µL Final    Immature Grans Absolute 0 03  0 00 - 0 20 Thousand/uL Final    Lymphocytes Absolute 1 51  0 60 - 4 47 Thousands/µL Final    Monocytes Absolute 0 78  0 17 - 1 22 Thousand/µL Final    Eosinophils Absolute 0 02  0 00 - 0 61 Thousand/µL Final    Basophils Absolute 0 04  0 00 - 0 10 Thousands/µL Final   COMPREHENSIVE METABOLIC PANEL - Abnormal    Sodium 136  135 - 147 mmol/L Final    Potassium 2 8 (*) 3 5 - 5 3 mmol/L Final    Chloride 95 (*) 96 - 108 mmol/L Final    CO2 28  21 - 32 mmol/L Final    ANION GAP 13  4 - 13 mmol/L Final    BUN 13  5 - 25 mg/dL Final    Creatinine 1 03  0 60 - 1 30 mg/dL Final    Comment: Standardized to IDMS reference method    Glucose 127  65 - 140 mg/dL Final    Comment: If the patient is fasting, the ADA then defines impaired fasting glucose as > 100 mg/dL and diabetes as > or equal to 123 mg/dL  Specimen collection should occur prior to Sulfasalazine administration due to the potential for falsely depressed results   Specimen collection should occur prior to Sulfapyridine administration due to the potential for falsely elevated results  Calcium 10 3 (*) 8 4 - 10 2 mg/dL Final    AST 16  13 - 39 U/L Final    Comment: Specimen collection should occur prior to Sulfasalazine administration due to the potential for falsely depressed results  ALT 14  7 - 52 U/L Final    Comment: Specimen collection should occur prior to Sulfasalazine administration due to the potential for falsely depressed results  Alkaline Phosphatase 63  34 - 104 U/L Final    Total Protein 8 3  6 4 - 8 4 g/dL Final    Albumin 4 7  3 5 - 5 0 g/dL Final    Total Bilirubin 1 37 (*) 0 20 - 1 00 mg/dL Final    eGFR 97  ml/min/1 73sq m Final    Narrative:     [place] guidelines for Chronic Kidney Disease (CKD):     Stage 1 with normal or high GFR (GFR > 90 mL/min/1 73 square meters)    Stage 2 Mild CKD (GFR = 60-89 mL/min/1 73 square meters)    Stage 3A Moderate CKD (GFR = 45-59 mL/min/1 73 square meters)    Stage 3B Moderate CKD (GFR = 30-44 mL/min/1 73 square meters)    Stage 4 Severe CKD (GFR = 15-29 mL/min/1 73 square meters)    Stage 5 End Stage CKD (GFR <15 mL/min/1 73 square meters)  Note: GFR calculation is accurate only with a steady state creatinine   LIPASE - Normal    Lipase 16  11 - 82 u/L Final     I have reviewed the labs and results with you  Please return to emergency department with any fever, chills, persistent vomiting, worsening pain, chest pain , shortness of breath,episodes of passing out, or lightheadedness, or any worsening symptoms or concerns

## 2022-08-13 NOTE — ED CARE HANDOFF
Emergency Department Sign Out Note        Sign out and transfer of care from [facility]  See Separate Emergency Department note  The patient, Williams Ganser, was evaluated by the previous provider for vomiting    Workup Completed:  Labs reviewed    ED Course / Workup Pending (followup):  Pt came for having persistent vomiting   Pt has h/o of cannabinoid abuse , and had been to ER for multiple visits  Pt had low K=2 8  Pt given medications for vomiting, and and IV fluids  Pt at er is hungry and want to eat and given meal and he tolerated it  Pt given K 20 mEq  IV and 40 mEq  And eat meal at er  Pt refuse to draw blood again for checking his K after given K  Pt to be discharged home and advise to stop abusing cannabinoid and eat one banana once daily  Procedures  MDM        Disposition  Final diagnoses:   None     ED Disposition     None      Follow-up Information    None       Patient's Medications   Discharge Prescriptions    No medications on file     No discharge procedures on file         ED Provider  Electronically Signed by     Ade Mares MD  08/14/22 9171

## 2022-08-13 NOTE — ED NOTES
Pt continues to complain of abdominal pain; attending verbally aware     Brissa Rao RN  08/13/22 5127

## 2022-08-13 NOTE — ED NOTES
Pt rang bell, "I am in a lot of pain, and I am nauseas, Can I eat?, I think that is why I have pain", provider made aware, ok to give food tray, pt now sitting on side of bed eating provided tray, in no apparent distress at this time     Juarez Javed RN  08/13/22 8194 DISPLAY PLAN FREE TEXT DISPLAY PLAN FREE TEXT

## 2022-08-13 NOTE — ED PROVIDER NOTES
History  Chief Complaint   Patient presents with    Abdominal Pain     Pt presents to the ED with c/o generalized abdominal pain and vomiting for the past 4 days     Patient is a 70-year-old male history of asthma presents emergency department today with complaint of severe nausea vomiting and upper abdominal pain x3 days  Patient states he was recently seen at hospital in [place] raise diagnosed with gastritis and sent home with dicyclomine, famotidine, promethazine, sucralfate with no relief as patient states he has been unable to keep these down  Patient states he has been unable to keep down any fluids or solids every time he attempts to he begins vomiting again  Denying any blood or bile in the vomit, denying any chest pain shortness of breath, constipation diarrhea, fevers, headaches  Chart review showed patient has been seen in the emergency department for similar episodes and diagnosed with cannabinoid hyperemesis  Patient currently denying any marijuana usage  Does admit to improvement with symptoms with hot showers  Abdominal Pain  Associated symptoms: nausea and vomiting    Associated symptoms: no chest pain, no chills, no fever and no shortness of breath        Prior to Admission Medications   Prescriptions Last Dose Informant Patient Reported?  Taking?   metoclopramide (Reglan) 10 mg tablet Not Taking at Unknown time  No No   Sig: Take 1 tablet (10 mg total) by mouth every 6 (six) hours   Patient not taking: Reported on 8/13/2022   omeprazole (PriLOSEC) 20 mg delayed release capsule   Yes Yes   Sig: Take 20 mg by mouth daily   ondansetron (ZOFRAN) 4 mg tablet Not Taking at Unknown time  No No   Sig: Take 1 tablet (4 mg total) by mouth every 6 (six) hours   Patient not taking: Reported on 8/13/2022      Facility-Administered Medications: None       Past Medical History:   Diagnosis Date    Asthma     Bipolar disorder [facility])        Past Surgical History:   Procedure Laterality Date    APPENDECTOMY      TONSILLECTOMY         History reviewed  No pertinent family history  I have reviewed and agree with the history as documented  E-Cigarette/Vaping     E-Cigarette/Vaping Substances     Social History     Tobacco Use    Smoking status: Current Every Day Smoker     Packs/day: 0 25     Types: Cigarettes    Smokeless tobacco: Never Used   Substance Use Topics    Alcohol use: Yes    Drug use: Yes     Types: Marijuana       Review of Systems   Constitutional: Negative for chills and fever  HENT: Negative  Respiratory: Negative for chest tightness and shortness of breath  Cardiovascular: Negative for chest pain  Gastrointestinal: Positive for abdominal pain, nausea and vomiting  Genitourinary: Negative  Musculoskeletal: Negative  Neurological: Positive for light-headedness  Negative for facial asymmetry and headaches  All other systems reviewed and are negative  Physical Exam  Physical Exam  Vitals and nursing note reviewed  Constitutional:       General: He is in acute distress  Appearance: He is well-developed  He is ill-appearing  HENT:      Head: Normocephalic and atraumatic  Cardiovascular:      Rate and Rhythm: Normal rate and regular rhythm  Heart sounds: Normal heart sounds  Pulmonary:      Effort: Pulmonary effort is normal       Breath sounds: Normal breath sounds  Chest:      Chest wall: No tenderness  Abdominal:      General: Abdomen is flat  Bowel sounds are normal       Palpations: Abdomen is soft  Tenderness: There is abdominal tenderness in the epigastric area  There is no guarding or rebound  Negative signs include Marinelli's sign  Neurological:      General: No focal deficit present  Mental Status: He is alert and oriented to person, place, and time  Psychiatric:         Mood and Affect: Mood normal          Behavior: Behavior normal  Behavior is cooperative  Thought Content:  Thought content normal  Judgment: Judgment normal          Vital Signs  ED Triage Vitals [08/13/22 0611]   Temperature Pulse Respirations Blood Pressure SpO2   98 2 °F (36 8 °C) 88 18 138/84 100 %      Temp Source Heart Rate Source Patient Position - Orthostatic VS BP Location FiO2 (%)   Oral Monitor Sitting Left arm --      Pain Score       10 - Worst Possible Pain           Vitals:    08/13/22 0611 08/13/22 0727   BP: 138/84 135/82   Pulse: 88 67   Patient Position - Orthostatic VS: Sitting Lying         Visual Acuity      ED Medications  Medications   potassium chloride 20 mEq IVPB (premix) (has no administration in time range)   ondansetron (ZOFRAN) injection 4 mg (has no administration in time range)   ondansetron (ZOFRAN) injection 4 mg (has no administration in time range)   metoclopramide (REGLAN) injection 10 mg (10 mg Intravenous Given 8/13/22 0626)   haloperidol lactate (HALDOL) injection 2 mg (2 mg Intramuscular Given 8/13/22 0629)   diphenhydrAMINE (BENADRYL) injection 12 5 mg (12 5 mg Intravenous Given 8/13/22 0627)       Diagnostic Studies  Results Reviewed     Procedure Component Value Units Date/Time    Comprehensive metabolic panel [[id number]]  (Abnormal) Collected: 08/13/22 0707    Lab Status: Final result Specimen: Blood from Arm, Left Updated: 08/13/22 0725     Sodium 136 mmol/L      Potassium 2 8 mmol/L      Chloride 95 mmol/L      CO2 28 mmol/L      ANION GAP 13 mmol/L      BUN 13 mg/dL      Creatinine 1 03 mg/dL      Glucose 127 mg/dL      Calcium 10 3 mg/dL      AST 16 U/L      ALT 14 U/L      Alkaline Phosphatase 63 U/L      Total Protein 8 3 g/dL      Albumin 4 7 g/dL      Total Bilirubin 1 37 mg/dL      eGFR 97 ml/min/1 73sq m     Narrative:      [place] guidelines for Chronic Kidney Disease (CKD):     Stage 1 with normal or high GFR (GFR > 90 mL/min/1 73 square meters)    Stage 2 Mild CKD (GFR = 60-89 mL/min/1 73 square meters)    Stage 3A Moderate CKD (GFR = 45-59 mL/min/1 73 square meters)    Stage 3B Moderate CKD (GFR = 30-44 mL/min/1 73 square meters)    Stage 4 Severe CKD (GFR = 15-29 mL/min/1 73 square meters)    Stage 5 End Stage CKD (GFR <15 mL/min/1 73 square meters)  Note: GFR calculation is accurate only with a steady state creatinine    Lipase [[id number]]  (Normal) Collected: 08/13/22 0707    Lab Status: Final result Specimen: Blood from Arm, Left Updated: 08/13/22 0725     Lipase 16 u/L     CBC and differential [[id number]]  (Abnormal) Collected: 08/13/22 0707    Lab Status: Final result Specimen: Blood from Arm, Left Updated: 08/13/22 0710     WBC 9 54 Thousand/uL      RBC 5 53 Million/uL      Hemoglobin 17 0 g/dL      Hematocrit 45 6 %      MCV 83 fL      MCH 30 7 pg      MCHC 37 3 g/dL      RDW 11 2 %      MPV 9 0 fL      Platelets 876 Thousands/uL      nRBC 0 /100 WBCs      Neutrophils Relative 76 %      Immat GRANS % 0 %      Lymphocytes Relative 16 %      Monocytes Relative 8 %      Eosinophils Relative 0 %      Basophils Relative 0 %      Neutrophils Absolute 7 16 Thousands/µL      Immature Grans Absolute 0 03 Thousand/uL      Lymphocytes Absolute 1 51 Thousands/µL      Monocytes Absolute 0 78 Thousand/µL      Eosinophils Absolute 0 02 Thousand/µL      Basophils Absolute 0 04 Thousands/µL                  No orders to display              Procedures  Procedures         ED Course                               SBIRT 20yo+    Flowsheet Row Most Recent Value   SBIRT (23 yo +)    In order to provide better care to our patients, we are screening all of our patients for alcohol and drug use  Would it be okay to ask you these screening questions? No Filed at: 08/13/2022 0562                    [facility]  Number of Diagnoses or Management Options  Diagnosis management comments: Patient is a 33-year-old male presents emergency department today with complaint of severe nausea vomiting  Examination showed tenderness palpation the epigastric region but was otherwise unremarkable  Patient seen evaluated emergency department 2 days ago was diagnosed with gastritis, head CT scan performed which showed thickening of the stomach was otherwise not concerning  Use discharged home with medication for symptomatic relief but is unable to keep them down  No CT imaging indicated at this time  Patient medicated with Haldol, Reglan, Zofran  On re-evaluation patient was sleeping  Suspected cannabinoid hyperemesis due to patient's history of marijuana smoking despite admitting marijuana use  He does admit to improvement of symptoms with hot showers which is consistent with cannabinoid hyperemesis  Laboratory evaluation showed hypokalemia at 2 8, given 20 mL bags of potassium  Transfer of care given to Dr Meaghan Vega pending re-evaluation an administration of potassium  Amount and/or Complexity of Data Reviewed  Clinical lab tests: ordered and reviewed    Patient Progress  Patient progress: stable      Disposition  Final diagnoses:   None     ED Disposition     None      Follow-up Information    None         Patient's Medications   Discharge Prescriptions    No medications on file       No discharge procedures on file      PDMP Review     None          ED Provider  Electronically Signed by           Izaiah Silva PA-C  08/13/22 6746

## 2022-08-14 VITALS
RESPIRATION RATE: 18 BRPM | TEMPERATURE: 98.2 F | HEART RATE: 67 BPM | SYSTOLIC BLOOD PRESSURE: 135 MMHG | OXYGEN SATURATION: 97 % | WEIGHT: 160 LBS | DIASTOLIC BLOOD PRESSURE: 82 MMHG | BODY MASS INDEX: 23.63 KG/M2

## 2022-08-17 ENCOUNTER — HOSPITAL ENCOUNTER (EMERGENCY)
Facility: HOSPITAL | Age: 32
Discharge: HOME | End: 2022-08-17
Attending: EMERGENCY MEDICINE
Payer: COMMERCIAL

## 2022-08-17 VITALS
BODY MASS INDEX: 22.96 KG/M2 | WEIGHT: 155 LBS | DIASTOLIC BLOOD PRESSURE: 66 MMHG | RESPIRATION RATE: 18 BRPM | SYSTOLIC BLOOD PRESSURE: 110 MMHG | HEIGHT: 69 IN | OXYGEN SATURATION: 98 % | TEMPERATURE: 98.5 F | HEART RATE: 79 BPM

## 2022-08-17 DIAGNOSIS — R10.13 EPIGASTRIC PAIN: Primary | ICD-10-CM

## 2022-08-17 DIAGNOSIS — R11.2 NAUSEA AND VOMITING, UNSPECIFIED VOMITING TYPE: ICD-10-CM

## 2022-08-17 LAB
ALBUMIN SERPL BCP-MCNC: 4.2 G/DL (ref 3.5–5)
ALP SERPL-CCNC: 53 U/L (ref 34–104)
ALT SERPL W P-5'-P-CCNC: 9 U/L (ref 7–52)
ANION GAP SERPL CALCULATED.3IONS-SCNC: 9 MMOL/L (ref 4–13)
AST SERPL W P-5'-P-CCNC: 12 U/L (ref 13–39)
BASOPHILS # BLD AUTO: 0.04 THOUSANDS/ΜL (ref 0–0.1)
BASOPHILS NFR BLD AUTO: 1 % (ref 0–1)
BILIRUB SERPL-MCNC: 1.29 MG/DL (ref 0.2–1)
BUN SERPL-MCNC: 17 MG/DL (ref 5–25)
CALCIUM SERPL-MCNC: 9.5 MG/DL (ref 8.4–10.2)
CHLORIDE SERPL-SCNC: 97 MMOL/L (ref 96–108)
CO2 SERPL-SCNC: 28 MMOL/L (ref 21–32)
CREAT SERPL-MCNC: 0.99 MG/DL (ref 0.6–1.3)
EOSINOPHIL # BLD AUTO: 0.03 THOUSAND/ΜL (ref 0–0.61)
EOSINOPHIL NFR BLD AUTO: 0 % (ref 0–6)
ERYTHROCYTE [DISTWIDTH] IN BLOOD BY AUTOMATED COUNT: 11.3 % (ref 11.6–15.1)
GFR SERPL CREATININE-BSD FRML MDRD: 102 ML/MIN/1.73SQ M
GLUCOSE SERPL-MCNC: 116 MG/DL (ref 65–140)
HCT VFR BLD AUTO: 47.6 % (ref 36.5–49.3)
HGB BLD-MCNC: 17.8 G/DL (ref 12–17)
IMM GRANULOCYTES # BLD AUTO: 0.03 THOUSAND/UL (ref 0–0.2)
IMM GRANULOCYTES NFR BLD AUTO: 0 % (ref 0–2)
LIPASE SERPL-CCNC: 22 U/L (ref 11–82)
LYMPHOCYTES # BLD AUTO: 1.78 THOUSANDS/ΜL (ref 0.6–4.47)
LYMPHOCYTES NFR BLD AUTO: 26 % (ref 14–44)
MCH RBC QN AUTO: 31.2 PG (ref 26.8–34.3)
MCHC RBC AUTO-ENTMCNC: 37.4 G/DL (ref 31.4–37.4)
MCV RBC AUTO: 83 FL (ref 82–98)
MONOCYTES # BLD AUTO: 0.48 THOUSAND/ΜL (ref 0.17–1.22)
MONOCYTES NFR BLD AUTO: 7 % (ref 4–12)
NEUTROPHILS # BLD AUTO: 4.57 THOUSANDS/ΜL (ref 1.85–7.62)
NEUTS SEG NFR BLD AUTO: 66 % (ref 43–75)
NRBC BLD AUTO-RTO: 0 /100 WBCS
PLATELET # BLD AUTO: 235 THOUSANDS/UL (ref 149–390)
PMV BLD AUTO: 9 FL (ref 8.9–12.7)
POTASSIUM SERPL-SCNC: 3.6 MMOL/L (ref 3.5–5.3)
PROT SERPL-MCNC: 7.5 G/DL (ref 6.4–8.4)
RBC # BLD AUTO: 5.71 MILLION/UL (ref 3.88–5.62)
SODIUM SERPL-SCNC: 134 MMOL/L (ref 135–147)
WBC # BLD AUTO: 6.93 THOUSAND/UL (ref 4.31–10.16)

## 2022-08-17 PROCEDURE — 99284 EMERGENCY DEPT VISIT MOD MDM: CPT | Performed by: EMERGENCY MEDICINE

## 2022-08-17 PROCEDURE — 83690 ASSAY OF LIPASE: CPT | Performed by: EMERGENCY MEDICINE

## 2022-08-17 PROCEDURE — 96375 TX/PRO/DX INJ NEW DRUG ADDON: CPT

## 2022-08-17 PROCEDURE — 36415 COLL VENOUS BLD VENIPUNCTURE: CPT | Performed by: EMERGENCY MEDICINE

## 2022-08-17 PROCEDURE — 85025 COMPLETE CBC W/AUTO DIFF WBC: CPT | Performed by: EMERGENCY MEDICINE

## 2022-08-17 PROCEDURE — 96372 THER/PROPH/DIAG INJ SC/IM: CPT

## 2022-08-17 PROCEDURE — 80053 COMPREHEN METABOLIC PANEL: CPT | Performed by: EMERGENCY MEDICINE

## 2022-08-17 PROCEDURE — 96374 THER/PROPH/DIAG INJ IV PUSH: CPT

## 2022-08-17 PROCEDURE — 99284 EMERGENCY DEPT VISIT MOD MDM: CPT

## 2022-08-17 PROCEDURE — 96361 HYDRATE IV INFUSION ADD-ON: CPT

## 2022-08-17 RX ORDER — MAGNESIUM HYDROXIDE/ALUMINUM HYDROXICE/SIMETHICONE 120; 1200; 1200 MG/30ML; MG/30ML; MG/30ML
30 SUSPENSION ORAL ONCE
Status: COMPLETED | OUTPATIENT
Start: 2022-08-17 | End: 2022-08-17

## 2022-08-17 RX ORDER — ONDANSETRON 2 MG/ML
4 INJECTION INTRAMUSCULAR; INTRAVENOUS ONCE
Status: COMPLETED | OUTPATIENT
Start: 2022-08-17 | End: 2022-08-17

## 2022-08-17 RX ORDER — METOCLOPRAMIDE HYDROCHLORIDE 5 MG/ML
10 INJECTION INTRAMUSCULAR; INTRAVENOUS ONCE
Status: COMPLETED | OUTPATIENT
Start: 2022-08-17 | End: 2022-08-17

## 2022-08-17 RX ORDER — LIDOCAINE HYDROCHLORIDE 20 MG/ML
15 SOLUTION OROPHARYNGEAL ONCE
Status: COMPLETED | OUTPATIENT
Start: 2022-08-17 | End: 2022-08-17

## 2022-08-17 RX ORDER — HALOPERIDOL 5 MG/ML
2 INJECTION INTRAMUSCULAR ONCE
Status: COMPLETED | OUTPATIENT
Start: 2022-08-17 | End: 2022-08-17

## 2022-08-17 RX ORDER — SUCRALFATE ORAL 1 G/10ML
1 SUSPENSION ORAL
Qty: 280 ML | Refills: 0 | Status: SHIPPED | OUTPATIENT
Start: 2022-08-17 | End: 2022-08-24

## 2022-08-17 RX ORDER — SUCRALFATE 1 G/1
1 TABLET ORAL ONCE
Status: COMPLETED | OUTPATIENT
Start: 2022-08-17 | End: 2022-08-17

## 2022-08-17 RX ORDER — FAMOTIDINE 10 MG/ML
20 INJECTION, SOLUTION INTRAVENOUS ONCE
Status: COMPLETED | OUTPATIENT
Start: 2022-08-17 | End: 2022-08-17

## 2022-08-17 RX ORDER — FAMOTIDINE 20 MG/1
20 TABLET, FILM COATED ORAL 2 TIMES DAILY
Qty: 10 TABLET | Refills: 0 | Status: SHIPPED | OUTPATIENT
Start: 2022-08-17 | End: 2022-08-22

## 2022-08-17 RX ORDER — OLANZAPINE 10 MG/1
10 TABLET ORAL
COMMUNITY

## 2022-08-17 RX ORDER — SUCRALFATE ORAL 1 G/10ML
1000 SUSPENSION ORAL ONCE
Status: DISCONTINUED | OUTPATIENT
Start: 2022-08-17 | End: 2022-08-17 | Stop reason: CLARIF

## 2022-08-17 RX ADMIN — HALOPERIDOL LACTATE 2 MG: 5 INJECTION, SOLUTION INTRAMUSCULAR at 14:28

## 2022-08-17 RX ADMIN — METOCLOPRAMIDE 10 MG: 5 INJECTION, SOLUTION INTRAMUSCULAR; INTRAVENOUS at 14:02

## 2022-08-17 RX ADMIN — ALUMINA, MAGNESIA, AND SIMETHICONE ORAL SUSPENSION REGULAR STRENGTH 30 ML: 1200; 1200; 120 SUSPENSION ORAL at 12:33

## 2022-08-17 RX ADMIN — SUCRALFATE 1 G: 1 TABLET ORAL at 12:39

## 2022-08-17 RX ADMIN — FAMOTIDINE 20 MG: 10 INJECTION, SOLUTION INTRAVENOUS at 12:35

## 2022-08-17 RX ADMIN — SODIUM CHLORIDE 1000 ML: 0.9 INJECTION, SOLUTION INTRAVENOUS at 12:36

## 2022-08-17 RX ADMIN — LIDOCAINE HYDROCHLORIDE 15 ML: 20 SOLUTION ORAL; TOPICAL at 12:33

## 2022-08-17 RX ADMIN — ONDANSETRON 4 MG: 2 INJECTION INTRAMUSCULAR; INTRAVENOUS at 12:34

## 2022-08-17 NOTE — ED PROVIDER NOTES
History  Chief Complaint   Patient presents with    Abdominal Pain     Patient here with c/o nausea, vomiting and mid abdominal pain x one week  Denies any sick contacts  Recently traveled to [place] on 8/9 and started feeling sick while there  16-year-old male with history of asthma, bipolar disorder who presents for evaluation of abdominal pain and vomiting  Patient reports that his symptoms began approximately 1 week ago while he was in [place]  He developed gradual onset of epigastric abdominal pain with nausea and multiple episodes of nonbloody nonbilious vomiting  Since that time, he has had persistent symptoms that have been neither worsening or improving  He has been evaluated multiple times in various emergency departments and treated for a combination gastritis as well as cannabis hyperemesis syndrome  He had a CT abdomen/pelvis performed on 8/11/22 with findings consistent with gastritis  Patient reports having not smoked marijuana for over 2 weeks at this time  He has persistent waxing and waning epigastric pain and constant nausea  He has emesis both after attempting to eat or drink as well as randomly throughout the day  He has not had a bowel movement in several days secondary to no oral intake  He denies any urinary symptoms, fevers, chest pain, shortness of breath  He is currently only taking the previously prescribed Reglan for his symptoms with no improvement  He has a surgical history consisting of an appendectomy  Prior to Admission Medications   Prescriptions Last Dose Informant Patient Reported? Taking?    OLANZapine (ZyPREXA) 10 mg tablet 8/17/2022 at Unknown time  Yes Yes   Sig: Take 10 mg by mouth daily at bedtime   metoclopramide (Reglan) 10 mg tablet Not Taking at Unknown time  No No   Sig: Take 1 tablet (10 mg total) by mouth every 6 (six) hours   Patient not taking: No sig reported   omeprazole (PriLOSEC) 20 mg delayed release capsule Not Taking at Unknown time Yes No   Sig: Take 20 mg by mouth daily   Patient not taking: Reported on 8/17/2022   ondansetron (ZOFRAN) 4 mg tablet Not Taking at Unknown time  No No   Sig: Take 1 tablet (4 mg total) by mouth every 6 (six) hours   Patient not taking: No sig reported   ondansetron (Zofran ODT) 4 mg disintegrating tablet Not Taking at Unknown time  No No   Sig: Take 1 tablet (4 mg total) by mouth every 6 (six) hours as needed for nausea or vomiting   Patient not taking: Reported on 8/17/2022      Facility-Administered Medications: None       Past Medical History:   Diagnosis Date    Asthma     Bipolar disorder ([facility] [address] )        Past Surgical History:   Procedure Laterality Date    APPENDECTOMY      TONSILLECTOMY         History reviewed  No pertinent family history  I have reviewed and agree with the history as documented  E-Cigarette/Vaping    E-Cigarette Use Current Every Day User      E-Cigarette/Vaping Substances     Social History     Tobacco Use    Smoking status: Current Every Day Smoker     Packs/day: 0 25     Types: Cigarettes    Smokeless tobacco: Never Used   Vaping Use    Vaping Use: Every day   Substance Use Topics    Alcohol use: Yes    Drug use: Yes     Types: Marijuana       Review of Systems   Constitutional: Negative for chills and fever  Eyes: Negative for visual disturbance  Respiratory: Negative for cough, chest tightness and shortness of breath  Cardiovascular: Negative for chest pain and leg swelling  Gastrointestinal: Positive for abdominal pain, nausea and vomiting  Negative for abdominal distention, blood in stool, constipation and diarrhea  Genitourinary: Negative for dysuria, flank pain, frequency and urgency  Musculoskeletal: Negative for back pain and gait problem  Skin: Negative for pallor and rash  Neurological: Negative for syncope, weakness and light-headedness  All other systems reviewed and are negative        Physical Exam  Physical Exam  Vitals and nursing note reviewed  Constitutional:       General: He is not in acute distress  Appearance: He is not ill-appearing  HENT:      Head: Normocephalic and atraumatic  Nose: Nose normal       Mouth/Throat:      Mouth: Mucous membranes are dry  Pharynx: No oropharyngeal exudate or posterior oropharyngeal erythema  Eyes:      General: No scleral icterus  Extraocular Movements: Extraocular movements intact  Cardiovascular:      Rate and Rhythm: Normal rate and regular rhythm  Heart sounds: No murmur heard  No friction rub  No gallop  Pulmonary:      Effort: Pulmonary effort is normal       Breath sounds: Normal breath sounds  No wheezing, rhonchi or rales  Abdominal:      General: There is no distension  Palpations: Abdomen is soft  Tenderness: There is abdominal tenderness in the epigastric area  There is no guarding or rebound  Musculoskeletal:         General: No swelling or tenderness  Normal range of motion  Cervical back: Normal range of motion and neck supple  Skin:     General: Skin is warm and dry  Coloration: Skin is not pale  Findings: No rash  Neurological:      General: No focal deficit present  Mental Status: He is alert and oriented to person, place, and time     Psychiatric:         Behavior: Behavior normal          Vital Signs  ED Triage Vitals [08/17/22 1212]   Temperature Pulse Respirations Blood Pressure SpO2   98 3 °F (36 8 °C) 85 18 119/85 99 %      Temp Source Heart Rate Source Patient Position - Orthostatic VS BP Location FiO2 (%)   Oral Monitor Lying Left arm --      Pain Score       7           Vitals:    08/17/22 1212 08/17/22 1430   BP: 119/85 110/66   Pulse: 85 79   Patient Position - Orthostatic VS: Lying Lying         Visual Acuity      ED Medications  Medications   sodium chloride 0 9 % bolus 1,000 mL (0 mL Intravenous Stopped 8/17/22 1401)   ondansetron (ZOFRAN) injection 4 mg (4 mg Intravenous Given 8/17/22 1234) aluminum-magnesium hydroxide-simethicone (MYLANTA) oral suspension 30 mL (30 mL Oral Given 8/17/22 1233)   Lidocaine Viscous HCl (XYLOCAINE) 2 % mucosal solution 15 mL (15 mL Swish & Swallow Given 8/17/22 1233)   Famotidine (PF) (PEPCID) injection 20 mg (20 mg Intravenous Given 8/17/22 1235)   sucralfate (CARAFATE) tablet 1 g (1 g Oral Given 8/17/22 1239)   metoclopramide (REGLAN) injection 10 mg (10 mg Intravenous Given 8/17/22 1402)   haloperidol lactate (HALDOL) injection 2 mg (2 mg Intramuscular Given 8/17/22 1428)       Diagnostic Studies  Results Reviewed     Procedure Component Value Units Date/Time    Comprehensive metabolic panel [[id number]]  (Abnormal) Collected: 08/17/22 1231    Lab Status: Final result Specimen: Blood from Arm, Left Updated: 08/17/22 1258     Sodium 134 mmol/L      Potassium 3 6 mmol/L      Chloride 97 mmol/L      CO2 28 mmol/L      ANION GAP 9 mmol/L      BUN 17 mg/dL      Creatinine 0 99 mg/dL      Glucose 116 mg/dL      Calcium 9 5 mg/dL      AST 12 U/L      ALT 9 U/L      Alkaline Phosphatase 53 U/L      Total Protein 7 5 g/dL      Albumin 4 2 g/dL      Total Bilirubin 1 29 mg/dL      eGFR 102 ml/min/1 73sq m     Narrative:      [place] guidelines for Chronic Kidney Disease (CKD):     Stage 1 with normal or high GFR (GFR > 90 mL/min/1 73 square meters)    Stage 2 Mild CKD (GFR = 60-89 mL/min/1 73 square meters)    Stage 3A Moderate CKD (GFR = 45-59 mL/min/1 73 square meters)    Stage 3B Moderate CKD (GFR = 30-44 mL/min/1 73 square meters)    Stage 4 Severe CKD (GFR = 15-29 mL/min/1 73 square meters)    Stage 5 End Stage CKD (GFR <15 mL/min/1 73 square meters)  Note: GFR calculation is accurate only with a steady state creatinine    Lipase [[id number]]  (Normal) Collected: 08/17/22 1231    Lab Status: Final result Specimen: Blood from Arm, Left Updated: 08/17/22 1258     Lipase 22 u/L     CBC and differential [[id number]]  (Abnormal) Collected: 08/17/22 1231    Lab Status: Final result Specimen: Blood from Arm, Left Updated: 08/17/22 1241     WBC 6 93 Thousand/uL      RBC 5 71 Million/uL      Hemoglobin 17 8 g/dL      Hematocrit 47 6 %      MCV 83 fL      MCH 31 2 pg      MCHC 37 4 g/dL      RDW 11 3 %      MPV 9 0 fL      Platelets 306 Thousands/uL      nRBC 0 /100 WBCs      Neutrophils Relative 66 %      Immat GRANS % 0 %      Lymphocytes Relative 26 %      Monocytes Relative 7 %      Eosinophils Relative 0 %      Basophils Relative 1 %      Neutrophils Absolute 4 57 Thousands/µL      Immature Grans Absolute 0 03 Thousand/uL      Lymphocytes Absolute 1 78 Thousands/µL      Monocytes Absolute 0 48 Thousand/µL      Eosinophils Absolute 0 03 Thousand/µL      Basophils Absolute 0 04 Thousands/µL                  No orders to display              Procedures  Procedures         ED Course  ED Course as of 08/17/22 1958   Wed Aug 17, 2022   1241 CBC and differential(!)   1301 Comprehensive metabolic panel(!)   6653 Lipase: 22   1314 Patient re-evaluated  Pain is slightly improved but continues with severe nausea  Will trial reglan and re-evaluate  1423 Patient re-evaluated after reglan administration  Patient actively vomiting in the room  Discussed admission given continued symptoms, patient declining at this time as he has to work tomorrow and does not want to call out  Offered trial of haldol which he is amenable to    1529 Patient re-evaluated  Feeling improved after haldol  Still opting for discharge over admission  SBIRT 22yo+    Flowsheet Row Most Recent Value   SBIRT (23 yo +)    In order to provide better care to our patients, we are screening all of our patients for alcohol and drug use  Would it be okay to ask you these screening questions? Yes Filed at: 08/17/2022 1211   Initial Alcohol Screen: US AUDIT-C     1  How often do you have a drink containing alcohol? 2 Filed at: 08/17/2022 1211   2   How many drinks containing alcohol do you have on a typical day you are drinking? 1 Filed at: 08/17/2022 1211   3a  Male UNDER 65: How often do you have five or more drinks on one occasion? 0 Filed at: 08/17/2022 1211   3b  FEMALE Any Age, or MALE 65+: How often do you have 4 or more drinks on one occassion? 0 Filed at: 08/17/2022 1211   Audit-C Score 3 Filed at: 08/17/2022 1211   TIMOTHY: How many times in the past year have you    Used an illegal drug or used a prescription medication for non-medical reasons? Weekly Filed at: 08/17/2022 1211   DAST-10: In the past 12 months       1  Have you used drugs other than those required for medical reasons? 1 Filed at: 08/17/2022 1211   2  Do you use more than one drug at a time? 0 Filed at: 08/17/2022 1211   3  Have you had medical problems as a result of your drug use (e g , memory loss, hepatitis, convulsions, bleeding, etc )? 0 Filed at: 08/17/2022 1211   4  Have you had "blackouts" or "flashbacks" as a result of drug use? YesNo 0 Filed at: 08/17/2022 1211   5  Do you ever feel bad or guilty about your drug use? 0 Filed at: 08/17/2022 1211   6  Does your spouse (or parent) ever complain about your involvement with drugs? 0 Filed at: 08/17/2022 1211   7  Have you neglected your family because of your use of drugs? 0 Filed at: 08/17/2022 1211   8  Have you engaged in illegal activities in order to obtain drugs? 0 Filed at: 08/17/2022 1211   9  Have you ever experienced withdrawal symptoms (felt sick) when you stopped taking drugs? 0 Filed at: 08/17/2022 1211   10  Are you always able to stop using drugs when you want to? 0 Filed at: 08/17/2022 1211   DAST-10 Score 1 Filed at: 08/17/2022 1211                    MDM  Number of Diagnoses or Management Options  Epigastric pain: new and requires workup  Nausea and vomiting, unspecified vomiting type: new and requires workup  Diagnosis management comments: 53-year-old male who presents for evaluation of epigastric pain and vomiting    Stable vital signs  Recent negative workup  Suspect components of gastritis as well as cannabis hyperemesis syndrome  Will repeat labs to assess for acute change  Will also treat symptomatically  Labs unremarkable  Patient with repeated vomiting after symptomatic treatment  Discussed admission with patient, however, he is unwilling to stay in the hospital at this time secondary to work  Will attempt treatment with Haldol  Patient with moderate improvement after Haldol administration  Continuing to decline admission  Referral to Gastroenterology placed  Advised follow-up with primary care physician as well  Return precautions discussed  Amount and/or Complexity of Data Reviewed  Clinical lab tests: ordered and reviewed  Review and summarize past medical records: yes    Risk of Complications, Morbidity, and/or Mortality  Presenting problems: high  Diagnostic procedures: low  Management options: moderate    Patient Progress  Patient progress: improved      Disposition  Final diagnoses:   Epigastric pain   Nausea and vomiting, unspecified vomiting type     Time reflects when diagnosis was documented in both MDM as applicable and the Disposition within this note     Time User Action Codes Description Comment    8/17/2022  3:31 PM Goyo Tan Add [R10 13] Epigastric pain     8/17/2022  3:31 PM Goyo Tan Add [R11 2] Nausea and vomiting, unspecified vomiting type       ED Disposition     ED Disposition   Discharge    Condition   Stable    Date/Time   Wed Aug 17, 2022  3:31 PM    Comment   Collette Ket discharge to home/self care                 Follow-up Information    None         Discharge Medication List as of 8/17/2022  3:36 PM      START taking these medications    Details   famotidine (PEPCID) 20 mg tablet Take 1 tablet (20 mg total) by mouth 2 (two) times a day for 5 days, Starting Wed 8/17/2022, Until Mon 8/22/2022, Normal      sucralfate (CARAFATE) 1 g/10 mL suspension Take 10 mL (1 g total) by mouth 4 (four) times a day (with meals and at bedtime) for 7 days, Starting Wed 8/17/2022, Until Wed 8/24/2022, Normal         CONTINUE these medications which have NOT CHANGED    Details   OLANZapine (ZyPREXA) 10 mg tablet Take 10 mg by mouth daily at bedtime, Historical Med      metoclopramide (Reglan) 10 mg tablet Take 1 tablet (10 mg total) by mouth every 6 (six) hours, Starting Fri 1/14/2022, Print      omeprazole (PriLOSEC) 20 mg delayed release capsule Take 20 mg by mouth daily, Historical Med      ondansetron (Zofran ODT) 4 mg disintegrating tablet Take 1 tablet (4 mg total) by mouth every 6 (six) hours as needed for nausea or vomiting, Starting Sat 8/13/2022, Normal      ondansetron (ZOFRAN) 4 mg tablet Take 1 tablet (4 mg total) by mouth every 6 (six) hours, Starting u 1/13/2022, Normal                 PDMP Review     None          ED Provider  Electronically Signed by           Trav Light MD  08/17/22 1958

## 2024-01-12 ENCOUNTER — HOSPITAL ENCOUNTER (EMERGENCY)
Facility: HOSPITAL | Age: 34
Discharge: HOME | End: 2024-01-12
Attending: EMERGENCY MEDICINE
Payer: COMMERCIAL

## 2024-01-12 VITALS
HEART RATE: 114 BPM | DIASTOLIC BLOOD PRESSURE: 74 MMHG | OXYGEN SATURATION: 97 % | SYSTOLIC BLOOD PRESSURE: 116 MMHG | RESPIRATION RATE: 16 BRPM | TEMPERATURE: 99.3 F

## 2024-01-12 DIAGNOSIS — J10.1 INFLUENZA A: Primary | ICD-10-CM

## 2024-01-12 LAB
FLUAV RNA RESP QL NAA+PROBE: POSITIVE
FLUBV RNA RESP QL NAA+PROBE: NEGATIVE
RSV RNA RESP QL NAA+PROBE: NEGATIVE
SARS-COV-2 RNA RESP QL NAA+PROBE: NEGATIVE

## 2024-01-12 PROCEDURE — 99284 EMERGENCY DEPT VISIT MOD MDM: CPT | Performed by: EMERGENCY MEDICINE

## 2024-01-12 PROCEDURE — 99283 EMERGENCY DEPT VISIT LOW MDM: CPT

## 2024-01-12 PROCEDURE — 0241U HB NFCT DS VIR RESP RNA 4 TRGT: CPT | Performed by: EMERGENCY MEDICINE

## 2024-01-12 RX ORDER — BENZONATATE 100 MG/1
100 CAPSULE ORAL ONCE
Status: COMPLETED | OUTPATIENT
Start: 2024-01-12 | End: 2024-01-12

## 2024-01-12 RX ORDER — IBUPROFEN 600 MG/1
600 TABLET ORAL ONCE
Status: COMPLETED | OUTPATIENT
Start: 2024-01-12 | End: 2024-01-12

## 2024-01-12 RX ADMIN — BENZONATATE 100 MG: 100 CAPSULE ORAL at 20:20

## 2024-01-12 RX ADMIN — IBUPROFEN 600 MG: 600 TABLET, FILM COATED ORAL at 20:20

## 2024-01-12 NOTE — Clinical Note
Edward Reyes was seen and treated in our emergency department on 1/12/2024.                Diagnosis:     Teodoro  may return to work on return date.    He may return on this date: 01/15/2024         If you have any questions or concerns, please don't hesitate to call.      Hang Silva MD    ______________________________           _______________          _______________  Hospital Representative                              Date                                Time

## 2024-01-13 NOTE — ED PROVIDER NOTES
History  Chief Complaint   Patient presents with    Flu Symptoms     PT presents to ED from home w/ cough, fatigue, hot sweats, and congestion for two days.     Patient is a 33-year-old male seen in the emergency department with concern for cough, congestion, fatigue, subjective fever, headache, body aches over approximate the past 1-2 days.  Patient notes multiple sick contacts at home with similar symptoms.  Patient notes minimal improvement with acetaminophen at home.  Patient notes no other definite clear exacerbating or alleviating factors for his symptoms.  Patient notes no chest pain, abdominal pain, nausea, vomiting, diarrhea, weakness.        Prior to Admission Medications   Prescriptions Last Dose Informant Patient Reported? Taking?   OLANZapine (ZyPREXA) 10 mg tablet   Yes No   Sig: Take 10 mg by mouth daily at bedtime   famotidine (PEPCID) 20 mg tablet   No No   Sig: Take 1 tablet (20 mg total) by mouth 2 (two) times a day for 5 days   metoclopramide (Reglan) 10 mg tablet   No No   Sig: Take 1 tablet (10 mg total) by mouth every 6 (six) hours   Patient not taking: No sig reported   omeprazole (PriLOSEC) 20 mg delayed release capsule   Yes No   Sig: Take 20 mg by mouth daily   Patient not taking: Reported on 8/17/2022   ondansetron (ZOFRAN) 4 mg tablet   No No   Sig: Take 1 tablet (4 mg total) by mouth every 6 (six) hours   Patient not taking: No sig reported   ondansetron (Zofran ODT) 4 mg disintegrating tablet   No No   Sig: Take 1 tablet (4 mg total) by mouth every 6 (six) hours as needed for nausea or vomiting   Patient not taking: Reported on 8/17/2022   sucralfate (CARAFATE) 1 g/10 mL suspension   No No   Sig: Take 10 mL (1 g total) by mouth 4 (four) times a day (with meals and at bedtime) for 7 days      Facility-Administered Medications: None       Past Medical History:   Diagnosis Date    Asthma     Bipolar disorder (HCC)        Past Surgical History:   Procedure Laterality Date    APPENDECTOMY       TONSILLECTOMY         History reviewed. No pertinent family history.  I have reviewed and agree with the history as documented.    E-Cigarette/Vaping    E-Cigarette Use Current Every Day User      E-Cigarette/Vaping Substances     Social History     Tobacco Use    Smoking status: Every Day     Current packs/day: 0.25     Types: Cigarettes    Smokeless tobacco: Never   Vaping Use    Vaping status: Every Day   Substance Use Topics    Alcohol use: Yes    Drug use: Yes     Types: Marijuana       Review of Systems   Constitutional:  Positive for fever. Negative for unexpected weight change.   HENT:  Negative for ear pain and trouble swallowing.    Eyes:  Negative for pain and visual disturbance.   Respiratory:  Positive for cough. Negative for shortness of breath.    Cardiovascular:  Negative for chest pain and palpitations.   Gastrointestinal:  Negative for abdominal pain and vomiting.   Genitourinary:  Negative for decreased urine volume and difficulty urinating.   Musculoskeletal:  Positive for arthralgias and myalgias. Negative for gait problem.   Skin:  Negative for color change and rash.   Neurological:  Positive for headaches. Negative for seizures and syncope.   Psychiatric/Behavioral:  Negative for agitation and confusion.        Physical Exam  Physical Exam  Vitals and nursing note reviewed.   Constitutional:       General: He is not in acute distress.     Appearance: He is well-developed.   HENT:      Head: Normocephalic and atraumatic.      Right Ear: External ear normal.      Left Ear: External ear normal.      Nose: Nose normal.      Mouth/Throat:      Mouth: Mucous membranes are moist.      Pharynx: Oropharynx is clear.   Eyes:      General: No scleral icterus.     Conjunctiva/sclera: Conjunctivae normal.   Cardiovascular:      Rate and Rhythm: Regular rhythm. Tachycardia present.      Heart sounds: No murmur heard.  Pulmonary:      Effort: Pulmonary effort is normal. No respiratory distress.       Breath sounds: Normal breath sounds.   Abdominal:      General: There is no distension.      Palpations: Abdomen is soft.      Tenderness: There is no abdominal tenderness.   Musculoskeletal:         General: No deformity or signs of injury.      Cervical back: Normal range of motion and neck supple.   Skin:     General: Skin is warm and dry.   Neurological:      General: No focal deficit present.      Mental Status: He is alert.      Cranial Nerves: No cranial nerve deficit.      Sensory: No sensory deficit.   Psychiatric:         Mood and Affect: Mood normal.         Thought Content: Thought content normal.         Vital Signs  ED Triage Vitals   Temperature Pulse Respirations Blood Pressure SpO2   01/12/24 1840 01/12/24 1840 01/12/24 1840 01/12/24 1840 01/12/24 1840   99.3 °F (37.4 °C) (!) 114 16 116/74 97 %      Temp Source Heart Rate Source Patient Position - Orthostatic VS BP Location FiO2 (%)   01/12/24 1840 -- -- -- --   Oral          Pain Score       01/12/24 2020       8           Vitals:    01/12/24 1840   BP: 116/74   Pulse: (!) 114         Visual Acuity      ED Medications  Medications   ibuprofen (MOTRIN) tablet 600 mg (600 mg Oral Given 1/12/24 2020)   benzonatate (TESSALON PERLES) capsule 100 mg (100 mg Oral Given 1/12/24 2020)       Diagnostic Studies  Results Reviewed       Procedure Component Value Units Date/Time    COVID19, Influenza A/B, RSV PCR, [facility] [[id number]]  (Abnormal) Collected: 01/12/24 2020    Lab Status: Final result Specimen: Nares from Nose Updated: 01/12/24 2115     SARS-CoV-2 Negative     INFLUENZA A PCR Positive     INFLUENZA B PCR Negative     RSV PCR Negative    Narrative:      FOR PEDIATRIC PATIENTS - copy/paste COVID Guidelines URL to browser: [email or web address]    SARS-CoV-2 assay is a Nucleic Acid Amplification assay intended for the  qualitative detection of nucleic acid from SARS-CoV-2 in nasopharyngeal  swabs.  Results are for the presumptive identification of SARS-CoV-2 RNA.    Positive results are indicative of infection with SARS-CoV-2, the virus  causing COVID-19, but do not rule out bacterial infection or co-infection  with other viruses. Laboratories within the [place] and its  territories are required to report all positive results to the appropriate  public health authorities. Negative results do not preclude SARS-CoV-2  infection and should not be used as the sole basis for treatment or other  patient management decisions. Negative results must be combined with  clinical observations, patient history, and epidemiological information.  This test has not been FDA cleared or approved.    This test has been authorized by FDA under an Emergency Use Authorization  (EUA). This test is only authorized for the duration of time the  declaration that circumstances exist justifying the authorization of the  emergency use of an in vitro diagnostic tests for detection of SARS-CoV-2  virus and/or diagnosis of COVID-19 infection under section 564(b)(1) of  the Act, 21 U.S.C. 360bbb-3(b)(1), unless the authorization is terminated  or revoked sooner. The test has been validated but independent review by FDA  and CLIA is pending.    Test performed using [employer] GeneXpert: This RT-PCR assay targets N2,  a region unique to SARS-CoV-2. A conserved region in the E-gene was chosen  for pan-Sarbecovirus detection which includes SARS-CoV-2.    According to CMS-2020-01-R, this platform meets the definition of high-throughput technology.                   No orders to display              Procedures  Procedures         ED Course                                             Medical Decision Making  Patient is a 33-year-old male seen in the emergency department with concern for cough, congestion, fatigue, subjective fever, headache, body aches.  Patient was treated with medication for symptom control.  COVID-19/influenza/RSV swab was  ordered in the emergency department, and test was positive for influenza A. Evaluation is consistent with influenza. Plan to have patient follow up with PCP/outpatient providers.  Patient stable for discharge home.  Discharge instructions were reviewed with patient.    Problems Addressed:  Influenza A: acute illness or injury    Amount and/or Complexity of Data Reviewed  Labs: ordered. Decision-making details documented in ED Course.    Risk  Prescription drug management.             Disposition  Final diagnoses:   Influenza A     Time reflects when diagnosis was documented in both MDM as applicable and the Disposition within this note       Time User Action Codes Description Comment    1/12/2024  7:59 PM Hang Silva Add [B34.9] Acute viral syndrome     1/12/2024  9:20 PM Hang Silva Add [J10.1] Influenza A     1/12/2024  9:20 PM Hang Silva Modify [J10.1] Influenza A     1/12/2024  9:20 PM Hang Silva Remove [B34.9] Acute viral syndrome           ED Disposition       ED Disposition   Discharge    Condition   Stable    Date/Time   Fri Jan 12, 2024  9:20 PM    Comment   Edward L Reyes discharge to home/self care.                   Follow-up Information       Follow up With Specialties Details Why Contact Info Additional Information    Your primary doctor  Call in 1 day       [facility] Call  As needed [address] [place] [id number] [phone] [facility], [address] [place] [address], [address] [phone]            Patient's Medications   Discharge Prescriptions    No medications on file       No discharge procedures on file.    PDMP Review       None            ED Provider  Electronically Signed by             Hang Silva MD  01/12/24 8212

## 2024-01-15 ENCOUNTER — HOSPITAL ENCOUNTER (EMERGENCY)
Facility: HOSPITAL | Age: 34
Discharge: HOME | End: 2024-01-15
Attending: EMERGENCY MEDICINE | Admitting: EMERGENCY MEDICINE
Payer: COMMERCIAL

## 2024-01-15 VITALS
DIASTOLIC BLOOD PRESSURE: 79 MMHG | SYSTOLIC BLOOD PRESSURE: 110 MMHG | OXYGEN SATURATION: 98 % | HEART RATE: 70 BPM | RESPIRATION RATE: 19 BRPM | TEMPERATURE: 98.6 F

## 2024-01-15 DIAGNOSIS — R11.2 NAUSEA AND VOMITING: Primary | ICD-10-CM

## 2024-01-15 DIAGNOSIS — E83.42 HYPOMAGNESEMIA: ICD-10-CM

## 2024-01-15 DIAGNOSIS — Z87.09 HISTORY OF INFLUENZA: ICD-10-CM

## 2024-01-15 DIAGNOSIS — E87.6 HYPOKALEMIA: ICD-10-CM

## 2024-01-15 LAB
ALBUMIN SERPL BCP-MCNC: 4.1 G/DL (ref 3.5–5)
ALP SERPL-CCNC: 51 U/L (ref 34–104)
ALT SERPL W P-5'-P-CCNC: 18 U/L (ref 7–52)
ANION GAP SERPL CALCULATED.3IONS-SCNC: 15 MMOL/L
AST SERPL W P-5'-P-CCNC: 16 U/L (ref 13–39)
ATRIAL RATE: 70 BPM
BASOPHILS # BLD MANUAL: 0 THOUSAND/UL (ref 0–0.1)
BASOPHILS NFR MAR MANUAL: 0 % (ref 0–1)
BILIRUB SERPL-MCNC: 0.76 MG/DL (ref 0.2–1)
BUN SERPL-MCNC: 16 MG/DL (ref 5–25)
CALCIUM SERPL-MCNC: 9.4 MG/DL (ref 8.4–10.2)
CHLORIDE SERPL-SCNC: 102 MMOL/L (ref 96–108)
CO2 SERPL-SCNC: 21 MMOL/L (ref 21–32)
CREAT SERPL-MCNC: 0.89 MG/DL (ref 0.6–1.3)
EOSINOPHIL # BLD MANUAL: 0 THOUSAND/UL (ref 0–0.4)
EOSINOPHIL NFR BLD MANUAL: 0 % (ref 0–6)
ERYTHROCYTE [DISTWIDTH] IN BLOOD BY AUTOMATED COUNT: 11.2 % (ref 11.6–15.1)
GFR SERPL CREATININE-BSD FRML MDRD: 113 ML/MIN/1.73SQ M
GLUCOSE SERPL-MCNC: 111 MG/DL (ref 65–140)
HCT VFR BLD AUTO: 45.8 % (ref 36.5–49.3)
HGB BLD-MCNC: 16.5 G/DL (ref 12–17)
LIPASE SERPL-CCNC: 12 U/L (ref 11–82)
LYMPHOCYTES # BLD AUTO: 1.15 THOUSAND/UL (ref 0.6–4.47)
LYMPHOCYTES # BLD AUTO: 19 % (ref 14–44)
MAGNESIUM SERPL-MCNC: 1.7 MG/DL (ref 1.9–2.7)
MCH RBC QN AUTO: 30.9 PG (ref 26.8–34.3)
MCHC RBC AUTO-ENTMCNC: 36 G/DL (ref 31.4–37.4)
MCV RBC AUTO: 86 FL (ref 82–98)
MONOCYTES # BLD AUTO: 0.31 THOUSAND/UL (ref 0–1.22)
MONOCYTES NFR BLD: 6 % (ref 4–12)
NEUTROPHILS # BLD MANUAL: 3.77 THOUSAND/UL (ref 1.85–7.62)
NEUTS BAND NFR BLD MANUAL: 5 % (ref 0–8)
NEUTS SEG NFR BLD AUTO: 67 % (ref 43–75)
P AXIS: 71 DEGREES
PLATELET # BLD AUTO: 185 THOUSANDS/UL (ref 149–390)
PLATELET BLD QL SMEAR: ADEQUATE
PMV BLD AUTO: 9.8 FL (ref 8.9–12.7)
POTASSIUM SERPL-SCNC: 2.9 MMOL/L (ref 3.5–5.3)
PR INTERVAL: 148 MS
PROT SERPL-MCNC: 7.7 G/DL (ref 6.4–8.4)
QRS AXIS: 96 DEGREES
QRSD INTERVAL: 86 MS
QT INTERVAL: 418 MS
QTC INTERVAL: 451 MS
RBC # BLD AUTO: 5.34 MILLION/UL (ref 3.88–5.62)
RBC MORPH BLD: NORMAL
SODIUM SERPL-SCNC: 138 MMOL/L (ref 135–147)
T WAVE AXIS: 55 DEGREES
VARIANT LYMPHS # BLD AUTO: 3 %
VENTRICULAR RATE: 70 BPM
WBC # BLD AUTO: 5.23 THOUSAND/UL (ref 4.31–10.16)

## 2024-01-15 PROCEDURE — 96372 THER/PROPH/DIAG INJ SC/IM: CPT

## 2024-01-15 PROCEDURE — 96375 TX/PRO/DX INJ NEW DRUG ADDON: CPT

## 2024-01-15 PROCEDURE — 85007 BL SMEAR W/DIFF WBC COUNT: CPT | Performed by: EMERGENCY MEDICINE

## 2024-01-15 PROCEDURE — 80053 COMPREHEN METABOLIC PANEL: CPT | Performed by: EMERGENCY MEDICINE

## 2024-01-15 PROCEDURE — 93005 ELECTROCARDIOGRAM TRACING: CPT

## 2024-01-15 PROCEDURE — 83690 ASSAY OF LIPASE: CPT | Performed by: EMERGENCY MEDICINE

## 2024-01-15 PROCEDURE — 96367 TX/PROPH/DG ADDL SEQ IV INF: CPT

## 2024-01-15 PROCEDURE — 96368 THER/DIAG CONCURRENT INF: CPT

## 2024-01-15 PROCEDURE — 83735 ASSAY OF MAGNESIUM: CPT | Performed by: EMERGENCY MEDICINE

## 2024-01-15 PROCEDURE — 85027 COMPLETE CBC AUTOMATED: CPT | Performed by: EMERGENCY MEDICINE

## 2024-01-15 PROCEDURE — 99285 EMERGENCY DEPT VISIT HI MDM: CPT | Performed by: EMERGENCY MEDICINE

## 2024-01-15 PROCEDURE — 96376 TX/PRO/DX INJ SAME DRUG ADON: CPT

## 2024-01-15 PROCEDURE — 96361 HYDRATE IV INFUSION ADD-ON: CPT

## 2024-01-15 PROCEDURE — 96366 THER/PROPH/DIAG IV INF ADDON: CPT

## 2024-01-15 PROCEDURE — 36415 COLL VENOUS BLD VENIPUNCTURE: CPT | Performed by: EMERGENCY MEDICINE

## 2024-01-15 PROCEDURE — 99284 EMERGENCY DEPT VISIT MOD MDM: CPT

## 2024-01-15 PROCEDURE — 96365 THER/PROPH/DIAG IV INF INIT: CPT

## 2024-01-15 RX ORDER — MAGNESIUM SULFATE 1 G/100ML
1 INJECTION INTRAVENOUS ONCE
Status: COMPLETED | OUTPATIENT
Start: 2024-01-15 | End: 2024-01-15

## 2024-01-15 RX ORDER — ONDANSETRON 4 MG/1
4 TABLET, ORALLY DISINTEGRATING ORAL EVERY 8 HOURS PRN
Qty: 5 TABLET | Refills: 0 | Status: SHIPPED | OUTPATIENT
Start: 2024-01-15 | End: 2024-01-18

## 2024-01-15 RX ORDER — HALOPERIDOL 5 MG/ML
5 INJECTION INTRAMUSCULAR ONCE
Status: COMPLETED | OUTPATIENT
Start: 2024-01-15 | End: 2024-01-15

## 2024-01-15 RX ORDER — KETOROLAC TROMETHAMINE 30 MG/ML
15 INJECTION, SOLUTION INTRAMUSCULAR; INTRAVENOUS ONCE
Status: COMPLETED | OUTPATIENT
Start: 2024-01-15 | End: 2024-01-15

## 2024-01-15 RX ORDER — POTASSIUM CHLORIDE 29.8 MG/ML
40 INJECTION INTRAVENOUS ONCE
Status: DISCONTINUED | OUTPATIENT
Start: 2024-01-15 | End: 2024-01-15 | Stop reason: SDUPTHER

## 2024-01-15 RX ORDER — LORAZEPAM 2 MG/ML
1 INJECTION INTRAMUSCULAR ONCE
Status: COMPLETED | OUTPATIENT
Start: 2024-01-15 | End: 2024-01-15

## 2024-01-15 RX ORDER — LORAZEPAM 2 MG/ML
0.5 INJECTION INTRAMUSCULAR ONCE
Status: DISCONTINUED | OUTPATIENT
Start: 2024-01-15 | End: 2024-01-15

## 2024-01-15 RX ORDER — FAMOTIDINE 20 MG/1
20 TABLET, FILM COATED ORAL 2 TIMES DAILY
Qty: 20 TABLET | Refills: 0 | Status: SHIPPED | OUTPATIENT
Start: 2024-01-15 | End: 2024-01-18

## 2024-01-15 RX ORDER — SUCRALFATE ORAL 1 G/10ML
1 SUSPENSION ORAL 4 TIMES DAILY PRN
Qty: 100 ML | Refills: 0 | Status: SHIPPED | OUTPATIENT
Start: 2024-01-15 | End: 2024-01-18

## 2024-01-15 RX ORDER — POTASSIUM CHLORIDE 14.9 MG/ML
20 INJECTION INTRAVENOUS
Status: COMPLETED | OUTPATIENT
Start: 2024-01-15 | End: 2024-01-15

## 2024-01-15 RX ORDER — ONDANSETRON 2 MG/ML
4 INJECTION INTRAMUSCULAR; INTRAVENOUS ONCE
Status: COMPLETED | OUTPATIENT
Start: 2024-01-15 | End: 2024-01-15

## 2024-01-15 RX ORDER — LORAZEPAM 2 MG/ML
0.5 INJECTION INTRAMUSCULAR ONCE
Status: COMPLETED | OUTPATIENT
Start: 2024-01-15 | End: 2024-01-15

## 2024-01-15 RX ADMIN — SODIUM CHLORIDE 1000 ML: 0.9 INJECTION, SOLUTION INTRAVENOUS at 00:23

## 2024-01-15 RX ADMIN — LORAZEPAM 1 MG: 2 INJECTION INTRAMUSCULAR; INTRAVENOUS at 03:57

## 2024-01-15 RX ADMIN — ONDANSETRON 4 MG: 2 INJECTION INTRAMUSCULAR; INTRAVENOUS at 00:24

## 2024-01-15 RX ADMIN — POTASSIUM CHLORIDE 20 MEQ: 14.9 INJECTION, SOLUTION INTRAVENOUS at 01:38

## 2024-01-15 RX ADMIN — ONDANSETRON 4 MG: 2 INJECTION INTRAMUSCULAR; INTRAVENOUS at 03:26

## 2024-01-15 RX ADMIN — POTASSIUM CHLORIDE 20 MEQ: 14.9 INJECTION, SOLUTION INTRAVENOUS at 03:34

## 2024-01-15 RX ADMIN — SODIUM CHLORIDE 1000 ML: 0.9 INJECTION, SOLUTION INTRAVENOUS at 03:33

## 2024-01-15 RX ADMIN — HALOPERIDOL LACTATE 5 MG: 5 INJECTION, SOLUTION INTRAMUSCULAR at 03:57

## 2024-01-15 RX ADMIN — MAGNESIUM SULFATE HEPTAHYDRATE 1 G: 1 INJECTION, SOLUTION INTRAVENOUS at 01:31

## 2024-01-15 RX ADMIN — LORAZEPAM 0.5 MG: 2 INJECTION INTRAMUSCULAR; INTRAVENOUS at 03:26

## 2024-01-15 RX ADMIN — KETOROLAC TROMETHAMINE 15 MG: 30 INJECTION, SOLUTION INTRAMUSCULAR; INTRAVENOUS at 01:25

## 2024-01-15 RX ADMIN — ONDANSETRON 4 MG: 2 INJECTION INTRAMUSCULAR; INTRAVENOUS at 06:51

## 2024-01-15 NOTE — ED PROVIDER NOTES
History  Chief Complaint   Patient presents with    Vomiting     Patient presents with vomiting for the since this evening.  States he was diagnosed with the flu yesterday     Patient is a 33-year-old male seen in the emergency department with concern for nausea/vomiting over approximately the past 1-2 days patient notes some generalized abdominal discomfort.  Patient explains that he was recently diagnosed with influenza review of records shows the patient was seen in the emergency department 1/12/2024, and diagnosed with influenza A at that time.  Patient notes minimal improvement with over-the-counter medication at home.  Patient notes no other definite clear exacerbating or alleviating factors for his symptoms.        Prior to Admission Medications   Prescriptions Last Dose Informant Patient Reported? Taking?   OLANZapine (ZyPREXA) 10 mg tablet   Yes No   Sig: Take 10 mg by mouth daily at bedtime   famotidine (PEPCID) 20 mg tablet   No No   Sig: Take 1 tablet (20 mg total) by mouth 2 (two) times a day for 5 days   metoclopramide (Reglan) 10 mg tablet   No No   Sig: Take 1 tablet (10 mg total) by mouth every 6 (six) hours   Patient not taking: No sig reported   omeprazole (PriLOSEC) 20 mg delayed release capsule   Yes No   Sig: Take 20 mg by mouth daily   Patient not taking: Reported on 8/17/2022   ondansetron (ZOFRAN) 4 mg tablet   No No   Sig: Take 1 tablet (4 mg total) by mouth every 6 (six) hours   Patient not taking: No sig reported   ondansetron (Zofran ODT) 4 mg disintegrating tablet   No No   Sig: Take 1 tablet (4 mg total) by mouth every 6 (six) hours as needed for nausea or vomiting   Patient not taking: Reported on 8/17/2022   sucralfate (CARAFATE) 1 g/10 mL suspension   No No   Sig: Take 10 mL (1 g total) by mouth 4 (four) times a day (with meals and at bedtime) for 7 days      Facility-Administered Medications: None       Past Medical History:   Diagnosis Date    Asthma     Bipolar disorder (HCC)         Past Surgical History:   Procedure Laterality Date    APPENDECTOMY      TONSILLECTOMY         History reviewed. No pertinent family history.  I have reviewed and agree with the history as documented.    E-Cigarette/Vaping    E-Cigarette Use Current Every Day User      E-Cigarette/Vaping Substances     Social History     Tobacco Use    Smoking status: Every Day     Current packs/day: 0.25     Types: Cigarettes    Smokeless tobacco: Never   Vaping Use    Vaping status: Every Day   Substance Use Topics    Alcohol use: Yes    Drug use: Yes     Types: Marijuana       Review of Systems   Constitutional:  Positive for fatigue. Negative for unexpected weight change.   HENT:  Negative for ear pain and trouble swallowing.    Eyes:  Negative for pain and visual disturbance.   Respiratory:  Negative for shortness of breath and wheezing.    Cardiovascular:  Negative for chest pain and palpitations.   Gastrointestinal:  Positive for abdominal pain, nausea and vomiting.   Genitourinary:  Negative for decreased urine volume and difficulty urinating.   Musculoskeletal:  Negative for gait problem.   Skin:  Negative for color change and rash.   Neurological:  Negative for seizures and syncope.   Psychiatric/Behavioral:  Negative for agitation and confusion.    All other systems reviewed and are negative.      Physical Exam  Physical Exam  Vitals and nursing note reviewed.   Constitutional:       General: He is not in acute distress.     Appearance: He is well-developed.   HENT:      Head: Normocephalic and atraumatic.      Right Ear: External ear normal.      Left Ear: External ear normal.      Nose: Nose normal.      Mouth/Throat:      Pharynx: Oropharynx is clear.   Eyes:      General: No scleral icterus.     Conjunctiva/sclera: Conjunctivae normal.   Cardiovascular:      Rate and Rhythm: Normal rate and regular rhythm.      Heart sounds: No murmur heard.  Pulmonary:      Effort: Pulmonary effort is normal. No respiratory  distress.      Breath sounds: Normal breath sounds.   Abdominal:      General: There is no distension.      Palpations: Abdomen is soft.      Tenderness: There is no abdominal tenderness.   Musculoskeletal:         General: No deformity or signs of injury.      Cervical back: Normal range of motion and neck supple.   Skin:     General: Skin is warm and dry.   Neurological:      General: No focal deficit present.      Mental Status: He is alert.      Cranial Nerves: No cranial nerve deficit.      Sensory: No sensory deficit.   Psychiatric:         Mood and Affect: Mood normal.         Thought Content: Thought content normal.         Vital Signs  ED Triage Vitals [01/15/24 0019]   Temperature Pulse Respirations Blood Pressure SpO2   98.1 °F (36.7 °C) 72 16 120/81 100 %      Temp Source Heart Rate Source Patient Position - Orthostatic VS BP Location FiO2 (%)   Oral Monitor Sitting Left arm --      Pain Score       No Pain           Vitals:    01/15/24 0019 01/15/24 0336   BP: 120/81 110/79   Pulse: 72 70   Patient Position - Orthostatic VS: Sitting Lying         Visual Acuity      ED Medications  Medications   ondansetron (ZOFRAN) injection 4 mg (has no administration in time range)   sodium chloride 0.9 % bolus 1,000 mL (0 mL Intravenous Stopped 1/15/24 0127)   ondansetron (ZOFRAN) injection 4 mg (4 mg Intravenous Given 1/15/24 0024)   ketorolac (TORADOL) injection 15 mg (15 mg Intravenous Given 1/15/24 0125)   magnesium sulfate IVPB (premix) SOLN 1 g (0 g Intravenous Stopped 1/15/24 0333)   potassium chloride 20 mEq IVPB (premix) (20 mEq Intravenous New Bag 1/15/24 0334)   ondansetron (ZOFRAN) injection 4 mg (4 mg Intravenous Given 1/15/24 0326)   LORazepam (ATIVAN) injection 0.5 mg (0.5 mg Intravenous Given 1/15/24 0326)   sodium chloride 0.9 % bolus 1,000 mL (1,000 mL Intravenous New Bag 1/15/24 0333)   haloperidol lactate (HALDOL) injection 5 mg (5 mg Intramuscular Given 1/15/24 0357)   LORazepam (ATIVAN)  injection 1 mg (1 mg Intravenous Given 1/15/24 0357)       Diagnostic Studies  Results Reviewed       Procedure Component Value Units Date/Time    RBC Morphology Reflex Test [[id number]] Collected: 01/15/24 0023    Lab Status: Final result Specimen: Blood from Arm, Right Updated: 01/15/24 0201    CBC and differential [[id number]]  (Abnormal) Collected: 01/15/24 0023    Lab Status: Final result Specimen: Blood from Arm, Right Updated: 01/15/24 0142     WBC 5.23 Thousand/uL      RBC 5.34 Million/uL      Hemoglobin 16.5 g/dL      Hematocrit 45.8 %      MCV 86 fL      MCH 30.9 pg      MCHC 36.0 g/dL      RDW 11.2 %      MPV 9.8 fL      Platelets 185 Thousands/uL     Narrative:      This is an appended report.  These results have been appended to a previously verified report.    Manual Differential(PHLEBS Do Not Order) [[id number]]  (Abnormal) Collected: 01/15/24 0023    Lab Status: Final result Specimen: Blood from Arm, Right Updated: 01/15/24 0142     Segmented % 67 %      Bands % 5 %      Lymphocytes % 19 %      Monocytes % 6 %      Eosinophils, % 0 %      Basophils % 0 %      Atypical Lymphocytes % 3 %      Absolute Neutrophils 3.77 Thousand/uL      Lymphocytes Absolute 1.15 Thousand/uL      Monocytes Absolute 0.31 Thousand/uL      Eosinophils Absolute 0.00 Thousand/uL      Basophils Absolute 0.00 Thousand/uL      Total Counted --     RBC Morphology Normal     Platelet Estimate Adequate    Magnesium [[id number]]  (Abnormal) Collected: 01/15/24 0023    Lab Status: Final result Specimen: Blood from Arm, Right Updated: 01/15/24 0116     Magnesium 1.7 mg/dL     Comprehensive metabolic panel [[id number]]  (Abnormal) Collected: 01/15/24 0023    Lab Status: Final result Specimen: Blood from Arm, Right Updated: 01/15/24 0046     Sodium 138 mmol/L      Potassium 2.9 mmol/L      Chloride 102 mmol/L      CO2 21 mmol/L      ANION GAP 15 mmol/L      BUN 16 mg/dL      Creatinine 0.89 mg/dL      Glucose 111 mg/dL      Calcium 9.4 mg/dL       AST 16 U/L      ALT 18 U/L      Alkaline Phosphatase 51 U/L      Total Protein 7.7 g/dL      Albumin 4.1 g/dL      Total Bilirubin 0.76 mg/dL      eGFR 113 ml/min/1.73sq m     Narrative:      National Kidney Disease Foundation guidelines for Chronic Kidney Disease (CKD):     Stage 1 with normal or high GFR (GFR > 90 mL/min/1.73 square meters)    Stage 2 Mild CKD (GFR = 60-89 mL/min/1.73 square meters)    Stage 3A Moderate CKD (GFR = 45-59 mL/min/1.73 square meters)    Stage 3B Moderate CKD (GFR = 30-44 mL/min/1.73 square meters)    Stage 4 Severe CKD (GFR = 15-29 mL/min/1.73 square meters)    Stage 5 End Stage CKD (GFR <15 mL/min/1.73 square meters)  Note: GFR calculation is accurate only with a steady state creatinine    Lipase [[id number]]  (Normal) Collected: 01/15/24 0023    Lab Status: Final result Specimen: Blood from Arm, Right Updated: 01/15/24 0046     Lipase 12 u/L     UA w Reflex to Microscopic w Reflex to Culture [[id number]]     Lab Status: No result Specimen: Urine, Clean Catch                    No orders to display              Procedures  ECG 12 Lead Documentation Only    Date/Time: 1/15/2024 1:01 AM    Performed by: Hang Silva MD  Authorized by: Hang Silva MD    Indications / Diagnosis:  Hypokalemia  ECG reviewed by me, the ED Provider: yes    Patient location:  ED  Rate:     ECG rate:  70    ECG rate assessment: normal    Rhythm:     Rhythm: sinus rhythm    QRS:     QRS axis:  Right  T waves:     T waves: non-specific    Comments:      Normal sinus rhythm at 70, right axis deviation, , QRS 86, QTc 451, nonspecific T wave abnormality, no definite evidence of acute ischemia           ED Course                               SBIRT 22yo+      Flowsheet Row Most Recent Value   Initial Alcohol Screen: US AUDIT-C     1. How often do you have a drink containing alcohol? 0 Filed at: 01/15/2024 0020   2. How many drinks containing alcohol do you have on a typical day you are drinking?  0  Filed at: 01/15/2024 0020   3a. Male UNDER 65: How often do you have five or more drinks on one occasion? 0 Filed at: 01/15/2024 0020   Audit-C Score 0 Filed at: 01/15/2024 0020   TIMOTHY: How many times in the past year have you...    Used an illegal drug or used a prescription medication for non-medical reasons? Never Filed at: 01/15/2024 0020                      Medical Decision Making  Patient is a 33-year-old male seen in the emergency department with concern for nausea/vomiting in the setting of recent influenza infection.  Patient was treated with medication for symptom control, with good effect.  Laboratory evaluation remarkable for low potassium of 2.9, low magnesium of 1.7, white blood cell count of 5.23, 67% segmented neutrophils, 5% bands. EKG was obtained and noted.  Potassium and magnesium repleted in the emergency department.  Evaluation is not consistent with acute pancreatitis, cholecystitis, or appendicitis.  Patient noted history of cannabinoid hyperemesis syndrome, which might be playing a role. Plan to have patient follow up with PCP/outpatient providers.  Patient stable for discharge home.  Discharge instructions were reviewed with patient.    Problems Addressed:  Hypokalemia: acute illness or injury  Hypomagnesemia: acute illness or injury  Nausea and vomiting: acute illness or injury    Amount and/or Complexity of Data Reviewed  Labs: ordered. Decision-making details documented in ED Course.  ECG/medicine tests: ordered and independent interpretation performed. Decision-making details documented in ED Course.    Risk  Prescription drug management.             Disposition  Final diagnoses:   Nausea and vomiting   Hypokalemia   History of influenza   Hypomagnesemia     Time reflects when diagnosis was documented in both MDM as applicable and the Disposition within this note       Time User Action Codes Description Comment    1/15/2024 12:25 AM Hang Silva Add [R11.2] Nausea and vomiting      1/15/2024 12:55 AM Hang Silva [E87.6] Hypokalemia     1/15/2024 12:55 AM Hang Silva [Z87.09] History of influenza     1/15/2024  1:30 AM Hang Silva [E83.42] Hypomagnesemia           ED Disposition       ED Disposition   Discharge    Condition   Stable    Date/Time   Mon Davon 15, 2024 0214    Comment   Edward L Reyes discharge to home/self care.                   Follow-up Information       Follow up With Specialties Details Why Contact Info Additional Information    Your primary doctor  Call in 1 day       [facility] Call  As needed [address] [facility] [address] [phone] [facility], [address] [place] [address], [address] [phone]    [facility] Call in 1 day  [address] [address]  [address]  [facility] [address] [phone] [facility] [place], [address], [address], [place], [place], [address] [phone]            Patient's Medications   Discharge Prescriptions    FAMOTIDINE (PEPCID) 20 MG TABLET    Take 1 tablet (20 mg total) by mouth 2 (two) times a day for 10 days       Start Date: 1/15/2024 End Date: 1/25/2024       Order Dose: 20 mg       Quantity: 20 tablet    Refills: 0    ONDANSETRON (ZOFRAN ODT) 4 MG DISINTEGRATING TABLET    Take 1 tablet (4 mg total) by mouth every 8 (eight) hours as needed for nausea for up to 3 days       Start Date: 1/15/2024 End Date: 1/18/2024       Order Dose: 4 mg       Quantity: 5 tablet    Refills: 0    SUCRALFATE (CARAFATE) 1 G/10 ML SUSPENSION    Take 10 mL (1 g total) by mouth 4 (four) times a day as needed (abdominal pain) for up to 10 days       Start Date: 1/15/2024 End Date: 1/25/2024       Order Dose: 1 g       Quantity: 100 mL    Refills: 0           PDMP Review       None            ED Provider  Electronically Signed by             Hang Silva  MD  01/15/24 0216       Hang Silva MD  01/15/24 0329       Hang Silva MD  01/15/24 0341       Hang Silva MD  01/15/24 0606

## 2024-01-16 ENCOUNTER — HOSPITAL ENCOUNTER (OUTPATIENT)
Facility: HOSPITAL | Age: 34
Setting detail: OBSERVATION
Discharge: HOME | End: 2024-01-18
Attending: EMERGENCY MEDICINE | Admitting: INTERNAL MEDICINE
Payer: COMMERCIAL

## 2024-01-16 ENCOUNTER — APPOINTMENT (EMERGENCY)
Dept: CT IMAGING | Facility: HOSPITAL | Age: 34
End: 2024-01-16
Payer: COMMERCIAL

## 2024-01-16 DIAGNOSIS — J10.1 INFLUENZA A: ICD-10-CM

## 2024-01-16 DIAGNOSIS — E83.42 HYPOMAGNESEMIA: ICD-10-CM

## 2024-01-16 DIAGNOSIS — J11.1 INFLUENZA: ICD-10-CM

## 2024-01-16 DIAGNOSIS — R11.2 NAUSEA AND VOMITING: Primary | ICD-10-CM

## 2024-01-16 DIAGNOSIS — E83.39 HYPOPHOSPHATEMIA: ICD-10-CM

## 2024-01-16 DIAGNOSIS — E87.6 HYPOKALEMIA: ICD-10-CM

## 2024-01-16 DIAGNOSIS — K52.9 COLITIS: ICD-10-CM

## 2024-01-16 PROBLEM — F12.10 TETRAHYDROCANNABINOL (THC) USE DISORDER, MILD, ABUSE: Status: ACTIVE | Noted: 2024-01-16

## 2024-01-16 LAB
ALBUMIN SERPL BCP-MCNC: 3.8 G/DL (ref 3.5–5)
ALP SERPL-CCNC: 42 U/L (ref 34–104)
ALT SERPL W P-5'-P-CCNC: 18 U/L (ref 7–52)
AMPHETAMINES SERPL QL SCN: NEGATIVE
ANION GAP SERPL CALCULATED.3IONS-SCNC: 15 MMOL/L
AST SERPL W P-5'-P-CCNC: 26 U/L (ref 13–39)
BARBITURATES UR QL: NEGATIVE
BENZODIAZ UR QL: NEGATIVE
BILIRUB SERPL-MCNC: 0.67 MG/DL (ref 0.2–1)
BUN SERPL-MCNC: 13 MG/DL (ref 5–25)
CALCIUM SERPL-MCNC: 9.1 MG/DL (ref 8.4–10.2)
CHLORIDE SERPL-SCNC: 102 MMOL/L (ref 96–108)
CO2 SERPL-SCNC: 20 MMOL/L (ref 21–32)
COCAINE UR QL: NEGATIVE
CREAT SERPL-MCNC: 0.76 MG/DL (ref 0.6–1.3)
ERYTHROCYTE [DISTWIDTH] IN BLOOD BY AUTOMATED COUNT: 11 % (ref 11.6–15.1)
GFR SERPL CREATININE-BSD FRML MDRD: 121 ML/MIN/1.73SQ M
GLUCOSE SERPL-MCNC: 97 MG/DL (ref 65–140)
HCT VFR BLD AUTO: 40.9 % (ref 36.5–49.3)
HGB BLD-MCNC: 15 G/DL (ref 12–17)
LIPASE SERPL-CCNC: 23 U/L (ref 11–82)
MAGNESIUM SERPL-MCNC: 1.8 MG/DL (ref 1.9–2.7)
MCH RBC QN AUTO: 31 PG (ref 26.8–34.3)
MCHC RBC AUTO-ENTMCNC: 36.7 G/DL (ref 31.4–37.4)
MCV RBC AUTO: 85 FL (ref 82–98)
METHADONE UR QL: NEGATIVE
OPIATES UR QL SCN: NEGATIVE
OXYCODONE+OXYMORPHONE UR QL SCN: NEGATIVE
PCP UR QL: NEGATIVE
PHOSPHATE SERPL-MCNC: 1.1 MG/DL (ref 2.7–4.5)
PLATELET # BLD AUTO: 184 THOUSANDS/UL (ref 149–390)
PMV BLD AUTO: 10 FL (ref 8.9–12.7)
POTASSIUM SERPL-SCNC: 3.1 MMOL/L (ref 3.5–5.3)
PROT SERPL-MCNC: 7 G/DL (ref 6.4–8.4)
RBC # BLD AUTO: 4.84 MILLION/UL (ref 3.88–5.62)
SODIUM SERPL-SCNC: 137 MMOL/L (ref 135–147)
THC UR QL: POSITIVE
WBC # BLD AUTO: 5.65 THOUSAND/UL (ref 4.31–10.16)

## 2024-01-16 PROCEDURE — 83690 ASSAY OF LIPASE: CPT | Performed by: EMERGENCY MEDICINE

## 2024-01-16 PROCEDURE — 96361 HYDRATE IV INFUSION ADD-ON: CPT

## 2024-01-16 PROCEDURE — 99222 1ST HOSP IP/OBS MODERATE 55: CPT | Performed by: INTERNAL MEDICINE

## 2024-01-16 PROCEDURE — 96376 TX/PRO/DX INJ SAME DRUG ADON: CPT

## 2024-01-16 PROCEDURE — 99284 EMERGENCY DEPT VISIT MOD MDM: CPT

## 2024-01-16 PROCEDURE — 96375 TX/PRO/DX INJ NEW DRUG ADDON: CPT

## 2024-01-16 PROCEDURE — 74177 CT ABD & PELVIS W/CONTRAST: CPT

## 2024-01-16 PROCEDURE — 99285 EMERGENCY DEPT VISIT HI MDM: CPT | Performed by: EMERGENCY MEDICINE

## 2024-01-16 PROCEDURE — 96365 THER/PROPH/DIAG IV INF INIT: CPT

## 2024-01-16 PROCEDURE — 85027 COMPLETE CBC AUTOMATED: CPT | Performed by: EMERGENCY MEDICINE

## 2024-01-16 PROCEDURE — 83735 ASSAY OF MAGNESIUM: CPT | Performed by: EMERGENCY MEDICINE

## 2024-01-16 PROCEDURE — 80053 COMPREHEN METABOLIC PANEL: CPT | Performed by: EMERGENCY MEDICINE

## 2024-01-16 PROCEDURE — 96367 TX/PROPH/DG ADDL SEQ IV INF: CPT

## 2024-01-16 PROCEDURE — 84100 ASSAY OF PHOSPHORUS: CPT | Performed by: EMERGENCY MEDICINE

## 2024-01-16 PROCEDURE — 80307 DRUG TEST PRSMV CHEM ANLYZR: CPT | Performed by: INTERNAL MEDICINE

## 2024-01-16 PROCEDURE — G1004 CDSM NDSC: HCPCS

## 2024-01-16 PROCEDURE — 93005 ELECTROCARDIOGRAM TRACING: CPT

## 2024-01-16 PROCEDURE — 96366 THER/PROPH/DIAG IV INF ADDON: CPT

## 2024-01-16 PROCEDURE — 36415 COLL VENOUS BLD VENIPUNCTURE: CPT | Performed by: EMERGENCY MEDICINE

## 2024-01-16 RX ORDER — DROPERIDOL 2.5 MG/ML
0.62 INJECTION, SOLUTION INTRAMUSCULAR; INTRAVENOUS ONCE
Status: COMPLETED | OUTPATIENT
Start: 2024-01-16 | End: 2024-01-16

## 2024-01-16 RX ORDER — MAGNESIUM SULFATE HEPTAHYDRATE 40 MG/ML
2 INJECTION, SOLUTION INTRAVENOUS ONCE
Status: COMPLETED | OUTPATIENT
Start: 2024-01-16 | End: 2024-01-16

## 2024-01-16 RX ORDER — OSELTAMIVIR PHOSPHATE 6 MG/ML
75 FOR SUSPENSION ORAL EVERY 12 HOURS SCHEDULED
Status: DISCONTINUED | OUTPATIENT
Start: 2024-01-16 | End: 2024-01-16

## 2024-01-16 RX ORDER — ONDANSETRON 2 MG/ML
4 INJECTION INTRAMUSCULAR; INTRAVENOUS ONCE
Status: COMPLETED | OUTPATIENT
Start: 2024-01-16 | End: 2024-01-16

## 2024-01-16 RX ORDER — NICOTINE 21 MG/24HR
1 PATCH, TRANSDERMAL 24 HOURS TRANSDERMAL DAILY
Status: DISCONTINUED | OUTPATIENT
Start: 2024-01-16 | End: 2024-01-18 | Stop reason: HOSPADM

## 2024-01-16 RX ORDER — OSELTAMIVIR PHOSPHATE 75 MG/1
75 CAPSULE ORAL EVERY 12 HOURS SCHEDULED
Status: DISCONTINUED | OUTPATIENT
Start: 2024-01-16 | End: 2024-01-18 | Stop reason: HOSPADM

## 2024-01-16 RX ORDER — ONDANSETRON 2 MG/ML
4 INJECTION INTRAMUSCULAR; INTRAVENOUS EVERY 6 HOURS PRN
Status: DISCONTINUED | OUTPATIENT
Start: 2024-01-16 | End: 2024-01-18 | Stop reason: HOSPADM

## 2024-01-16 RX ORDER — POTASSIUM CHLORIDE 14.9 MG/ML
20 INJECTION INTRAVENOUS ONCE
Status: COMPLETED | OUTPATIENT
Start: 2024-01-16 | End: 2024-01-16

## 2024-01-16 RX ORDER — DIPHENHYDRAMINE HYDROCHLORIDE 50 MG/ML
25 INJECTION INTRAMUSCULAR; INTRAVENOUS ONCE
Status: COMPLETED | OUTPATIENT
Start: 2024-01-16 | End: 2024-01-16

## 2024-01-16 RX ORDER — BENZONATATE 100 MG/1
100 CAPSULE ORAL 3 TIMES DAILY PRN
Status: DISCONTINUED | OUTPATIENT
Start: 2024-01-16 | End: 2024-01-18 | Stop reason: HOSPADM

## 2024-01-16 RX ORDER — ENOXAPARIN SODIUM 100 MG/ML
40 INJECTION SUBCUTANEOUS
Status: DISCONTINUED | OUTPATIENT
Start: 2024-01-17 | End: 2024-01-18 | Stop reason: HOSPADM

## 2024-01-16 RX ORDER — TRAZODONE HYDROCHLORIDE 150 MG/1
150 TABLET ORAL
COMMUNITY
Start: 2023-12-04 | End: 2024-01-18

## 2024-01-16 RX ORDER — CEFTRIAXONE 1 G/50ML
1000 INJECTION, SOLUTION INTRAVENOUS EVERY 24 HOURS
Status: DISCONTINUED | OUTPATIENT
Start: 2024-01-16 | End: 2024-01-18 | Stop reason: HOSPADM

## 2024-01-16 RX ORDER — SODIUM CHLORIDE 9 MG/ML
125 INJECTION, SOLUTION INTRAVENOUS CONTINUOUS
Status: DISCONTINUED | OUTPATIENT
Start: 2024-01-16 | End: 2024-01-18 | Stop reason: HOSPADM

## 2024-01-16 RX ORDER — MORPHINE SULFATE 4 MG/ML
4 INJECTION, SOLUTION INTRAMUSCULAR; INTRAVENOUS EVERY 4 HOURS PRN
Status: DISCONTINUED | OUTPATIENT
Start: 2024-01-16 | End: 2024-01-18 | Stop reason: HOSPADM

## 2024-01-16 RX ORDER — METOCLOPRAMIDE HYDROCHLORIDE 5 MG/ML
10 INJECTION INTRAMUSCULAR; INTRAVENOUS ONCE
Status: COMPLETED | OUTPATIENT
Start: 2024-01-16 | End: 2024-01-16

## 2024-01-16 RX ORDER — ACETAMINOPHEN 325 MG/1
650 TABLET ORAL EVERY 6 HOURS PRN
Status: DISCONTINUED | OUTPATIENT
Start: 2024-01-16 | End: 2024-01-18 | Stop reason: HOSPADM

## 2024-01-16 RX ORDER — METRONIDAZOLE 500 MG/100ML
500 INJECTION, SOLUTION INTRAVENOUS EVERY 8 HOURS
Status: DISCONTINUED | OUTPATIENT
Start: 2024-01-16 | End: 2024-01-17

## 2024-01-16 RX ADMIN — METRONIDAZOLE 500 MG: 500 INJECTION, SOLUTION INTRAVENOUS at 21:09

## 2024-01-16 RX ADMIN — BENZONATATE 100 MG: 100 CAPSULE ORAL at 19:12

## 2024-01-16 RX ADMIN — METRONIDAZOLE 500 MG: 500 INJECTION, SOLUTION INTRAVENOUS at 13:37

## 2024-01-16 RX ADMIN — MAGNESIUM SULFATE HEPTAHYDRATE 2 G: 40 INJECTION, SOLUTION INTRAVENOUS at 06:34

## 2024-01-16 RX ADMIN — ONDANSETRON 4 MG: 2 INJECTION INTRAMUSCULAR; INTRAVENOUS at 17:50

## 2024-01-16 RX ADMIN — DROPERIDOL 0.62 MG: 2.5 INJECTION, SOLUTION INTRAMUSCULAR; INTRAVENOUS at 07:29

## 2024-01-16 RX ADMIN — IOHEXOL 100 ML: 350 INJECTION, SOLUTION INTRAVENOUS at 06:18

## 2024-01-16 RX ADMIN — ONDANSETRON 4 MG: 2 INJECTION INTRAMUSCULAR; INTRAVENOUS at 05:43

## 2024-01-16 RX ADMIN — DIPHENHYDRAMINE HYDROCHLORIDE 25 MG: 50 INJECTION, SOLUTION INTRAMUSCULAR; INTRAVENOUS at 21:08

## 2024-01-16 RX ADMIN — OSELTAMIVIR PHOSPHATE 75 MG: 75 CAPSULE ORAL at 21:20

## 2024-01-16 RX ADMIN — MORPHINE SULFATE 4 MG: 4 INJECTION INTRAVENOUS at 21:08

## 2024-01-16 RX ADMIN — METOCLOPRAMIDE HYDROCHLORIDE 10 MG: 5 INJECTION INTRAMUSCULAR; INTRAVENOUS at 20:19

## 2024-01-16 RX ADMIN — SODIUM CHLORIDE 125 ML/HR: 0.9 INJECTION, SOLUTION INTRAVENOUS at 14:22

## 2024-01-16 RX ADMIN — OSELTAMIVIR PHOSPHATE 75 MG: 75 CAPSULE ORAL at 14:44

## 2024-01-16 RX ADMIN — SODIUM CHLORIDE 1000 ML: 0.9 INJECTION, SOLUTION INTRAVENOUS at 05:34

## 2024-01-16 RX ADMIN — DROPERIDOL 0.62 MG: 2.5 INJECTION, SOLUTION INTRAMUSCULAR; INTRAVENOUS at 06:31

## 2024-01-16 RX ADMIN — CEFTRIAXONE 1000 MG: 1 INJECTION, SOLUTION INTRAVENOUS at 14:23

## 2024-01-16 RX ADMIN — POTASSIUM PHOSPHATE, MONOBASIC AND POTASSIUM PHOSPHATE, DIBASIC 30 MMOL: 224; 236 INJECTION, SOLUTION, CONCENTRATE INTRAVENOUS at 08:07

## 2024-01-16 RX ADMIN — ONDANSETRON 4 MG: 2 INJECTION INTRAMUSCULAR; INTRAVENOUS at 05:38

## 2024-01-16 RX ADMIN — POTASSIUM CHLORIDE 20 MEQ: 14.9 INJECTION, SOLUTION INTRAVENOUS at 21:09

## 2024-01-16 NOTE — H&P
[facility]  H&P  Name: Edward L Reyes 33 y.o. male I MRN: [id number]  Unit/Bed#: [facility] I Date of Admission: 1/16/2024   Date of Service: 1/16/2024 I Hospital Day: 0      Assessment/Plan   * Colitis  Assessment & Plan  Patient presents with nausea vomiting and diarrhea and epigastric pain,  CT of the abdomen and pelvis with contrast was reviewed shows possible colitis,  Will IV hydration and IV antibiotic therapy with Flagyl and Rocephin, will check inflammatory markers,  Also patient admits to smoke marijuana and intractable vomiting could be related to THC hyperemesis    Tetrahydrocannabinol (THC) use disorder, mild, abuse  Assessment & Plan  Intractable vomiting likely with underlying THC use  Advised about THC cessation    Intractable vomiting with nausea  Assessment & Plan  intractable vomiting during secondary to colitis with influenza A positive,   Give IV fluid hydration and IV antiemetics    Influenza A  Assessment & Plan  Presents with low-grade fever cough and nausea ,vomiting ,and diarrhea and flulike symptoms  Influenza A tested positive on 1/12/24  Will give Tamiflu due to persistent symptoms           VTE Pharmacologic Prophylaxis:   Moderate Risk (Score 3-4) - Pharmacological DVT Prophylaxis Ordered: enoxaparin (Lovenox).  Code Status: Level 1 - Full Code   Discussion with family: Patient declined call to .     Anticipated Length of Stay: Patient will be admitted on an observation basis with an anticipated length of stay of less than 2 midnights secondary to tractable wall vomiting, low-grade fever.    Total Time Spent on Date of Encounter in care of patient: 45 mins. This time was spent on one or more of the following: performing physical exam; counseling and coordination of care; obtaining or reviewing history; documenting in the medical record; reviewing/ordering tests, medications or procedures; communicating with other healthcare professionals and discussing  with patient's family/caregivers.    Chief Complaint: Cough fever generalized weakness and persistent vomiting and diarrhea neck    History of Present Illness:  Edward L Reyes is a 33 y.o. male with no significant medical history presents with nausea vomiting and abdominal pain and diarrhea for the past 3 to 4 days.  Patient has been having low-grade fever and cough and flulike symptoms.  Tested positive for influenza A on 1/12/2024 when he presented to the ED with flulike symptoms, also was in the ED on 1/15 with nausea and vomiting.  Was given IV fluid hydration and electrolyte replacement.  Persist to have 5-6 times of vomiting which is clear, no hematemesis.  Complains of epigastric pain intensity of 4/10.  Denies of any chest pain or shortness of breath.  Has diarrhea at least 3-4 times a day for the past 4 days.  Unable to keep p.o. after a trial of fluid in the ED despite the antiemetics with droperidol and Zofran in the ED.  Currently receiving potassium phosphate IV infusion.  review of Systems:  Review of Systems   Constitutional:  Positive for activity change, appetite change, fatigue and fever.   Gastrointestinal:  Positive for abdominal pain, diarrhea, nausea and vomiting.       Past Medical and Surgical History:   Past Medical History:   Diagnosis Date    Asthma     Bipolar disorder (HCC)        Past Surgical History:   Procedure Laterality Date    APPENDECTOMY      TONSILLECTOMY         Meds/Allergies:  Prior to Admission medications    Medication Sig Start Date End Date Taking? Authorizing Provider   famotidine (PEPCID) 20 mg tablet Take 1 tablet (20 mg total) by mouth 2 (two) times a day for 10 days 1/15/24 1/25/24  Hang Silva MD   metoclopramide (Reglan) 10 mg tablet Take 1 tablet (10 mg total) by mouth every 6 (six) hours  Patient not taking: No sig reported 1/14/22   Aretha Estevez PA-C   OLANZapine (ZyPREXA) 10 mg tablet Take 10 mg by mouth daily at bedtime    Historical Provider,  MD   omeprazole (PriLOSEC) 20 mg delayed release capsule Take 20 mg by mouth daily  Patient not taking: Reported on 8/17/2022    Historical Provider, MD   ondansetron (Zofran ODT) 4 mg disintegrating tablet Take 1 tablet (4 mg total) by mouth every 8 (eight) hours as needed for nausea for up to 3 days 1/15/24 1/18/24  Hang Silva MD   ondansetron (ZOFRAN) 4 mg tablet Take 1 tablet (4 mg total) by mouth every 6 (six) hours  Patient not taking: No sig reported 1/13/22   Krystian Santoro MD   sucralfate (CARAFATE) 1 g/10 mL suspension Take 10 mL (1 g total) by mouth 4 (four) times a day as needed (abdominal pain) for up to 10 days 1/15/24 1/25/24  Hang Silva MD     I have reviewed home medications with patient personally.    Allergies: No Known Allergies    Social History:  Marital Status: Single   Occupation: employed   Patient Pre-hospital Living Situation: Home  Patient Pre-hospital Level of Mobility: walks  Patient Pre-hospital Diet Restrictions: nil  Substance Use History:   Social History     Substance and Sexual Activity   Alcohol Use Yes     Social History     Tobacco Use   Smoking Status Every Day    Current packs/day: 0.25    Types: Cigarettes   Smokeless Tobacco Never     Social History     Substance and Sexual Activity   Drug Use Yes    Types: Marijuana       Family History:  History reviewed. No pertinent family history.    Physical Exam:     Vitals:   Blood Pressure: 106/57 (01/16/24 0700)  Pulse: 59 (01/16/24 0810)  Temperature: 98.2 °F (36.8 °C) (01/16/24 0514)  Temp Source: Oral (01/16/24 0514)  Respirations: 17 (01/16/24 0810)  Weight - Scale: 67.4 kg (148 lb 11.2 oz) (01/16/24 1400)  SpO2: 96 % (01/16/24 0700)    Physical Exam   HEENT-PERRLA, dry oral mucosa  Neck-supple, no JVD elevation   Respiratory-equal air entry bilaterally, no rales or rhonchi  Cardiovascular system-S1, S2 heard, no murmur or gallops or rubs  Abdomen-soft, epigastric tenderness present no guarding or rigidity, bowel  sounds heard  Extremities-no pedal edema  Peripheral pulses palpable  Musculoskeletal-no contractures  Central nervous system-no acute focal neurological deficit ,no sensory or motor deficit noted.  Skin-no rash noted       Additional Data:     Lab Results:  Results from last 7 days   Lab Units 01/16/24  0532 01/15/24  0023   WBC Thousand/uL 5.65 5.23   HEMOGLOBIN g/dL 15.0 16.5   HEMATOCRIT % 40.9 45.8   PLATELETS Thousands/uL 184 185   BANDS PCT %  --  5   LYMPHO PCT %  --  19   MONO PCT %  --  6   EOS PCT %  --  0     Results from last 7 days   Lab Units 01/16/24  0532   SODIUM mmol/L 137   POTASSIUM mmol/L 3.1*   CHLORIDE mmol/L 102   CO2 mmol/L 20*   BUN mg/dL 13   CREATININE mg/dL 0.76   ANION GAP mmol/L 15   CALCIUM mg/dL 9.1   ALBUMIN g/dL 3.8   TOTAL BILIRUBIN mg/dL 0.67   ALK PHOS U/L 42   ALT U/L 18   AST U/L 26   GLUCOSE RANDOM mg/dL 97                       Lines/Drains:  Invasive Devices       Peripheral Intravenous Line  Duration             Peripheral IV 01/16/24 Distal;Left;Upper;Ventral (anterior) Arm <1 day                        Imaging: Personally reviewed the following imaging: abdominal/pelvic CT  CT abdomen pelvis with contrast   Final Result by Yuly Silveira MD (01/16 0643)      Possible colitis, as described above. Please see discussion. Clinical correlation recommended. No bowel obstruction. Prior appendectomy.      Borderline splenomegaly.            Workstation performed: [id number]             EKG and Other Studies Reviewed on Admission:   EKG: NSR. HR no acute ST-T changes noted, QTc within normal limit,.    ** Please Note: This note has been constructed using a voice recognition system. **

## 2024-01-16 NOTE — ASSESSMENT & PLAN NOTE
Presents with low-grade fever cough and nausea ,vomiting ,and diarrhea and flulike symptoms  Influenza A tested positive on 1/12/24  Will give Tamiflu due to persistent symptoms   Yu Rios  (RN)  2021 19:17:41 Yu Rios  (RN)  2021 19:20:29

## 2024-01-16 NOTE — PLAN OF CARE
Problem: SAFETY ADULT  Goal: Patient will remain free of falls  Description: INTERVENTIONS:  - Educate patient/family on patient safety including physical limitations  - Instruct patient to call for assistance with activity   - Consult OT/PT to assist with strengthening/mobility   - Keep Call bell within reach  - Keep bed low and locked with side rails adjusted as appropriate  - Keep care items and personal belongings within reach  - Initiate and maintain comfort rounds  - Offer Toileting every 2 Hours, in advance of need  - Apply yellow socks and bracelet for high fall risk patients  - Consider moving patient to room near nurses station  Outcome: Progressing

## 2024-01-16 NOTE — ED PROVIDER NOTES
History  Chief Complaint   Patient presents with    Vomiting     Pt states he was seen here yesterday for vomiting, dx w/ flu and hypokalemia, states called ems due to hand and leg numbness/ cramping And worsening vomiting. No meds PTA.      32 y/o male with hx of bipolar disorder and asthma presents to the ED via EMS from home for evaluation of persistent nausea and vomiting.  The patient started experiencing viral URI symptoms 4-5 days ago and on 1/12/2024 was seen in the ER and diagnosed with influenza A.  He was seen in the ER yesterday for his influenza symptoms as well as generalized abdominal pain, nausea, and vomiting.  He has had similar episodes of nausea and vomiting in the past due to suspected cannabinoid hyperemesis and was treated symptomatically.  At that time he was also diagnosed with hypokalemia which was repleted.  He was discharged home, however he states that he has still had persistent nausea and vomiting with generalized abdominal discomfort and is now experiencing intermittent bilateral upper extremity and lower extremity paresthesias and cramping.  He tried to take his antiemetic medications at home but vomited immediately, so he came to the ER for evaluation via EMS.  He states that he last used marijuana 4 to 5 days ago.  He denies any new foods or medications.        Prior to Admission Medications   Prescriptions Last Dose Informant Patient Reported? Taking?   OLANZapine (ZyPREXA) 10 mg tablet Not Taking  Yes No   Sig: Take 10 mg by mouth daily at bedtime   Patient not taking: Reported on 1/16/2024   famotidine (PEPCID) 20 mg tablet Not Taking  No No   Sig: Take 1 tablet (20 mg total) by mouth 2 (two) times a day for 10 days   Patient not taking: Reported on 1/16/2024   metoclopramide (Reglan) 10 mg tablet Not Taking  No No   Sig: Take 1 tablet (10 mg total) by mouth every 6 (six) hours   Patient not taking: Reported on 8/13/2022   omeprazole (PriLOSEC) 20 mg delayed release capsule Not  Taking  Yes No   Sig: Take 20 mg by mouth daily   Patient not taking: Reported on 8/17/2022   ondansetron (ZOFRAN) 4 mg tablet Not Taking  No No   Sig: Take 1 tablet (4 mg total) by mouth every 6 (six) hours   Patient not taking: Reported on 8/13/2022   ondansetron (Zofran ODT) 4 mg disintegrating tablet Not Taking  No No   Sig: Take 1 tablet (4 mg total) by mouth every 8 (eight) hours as needed for nausea for up to 3 days   Patient not taking: Reported on 1/16/2024   sucralfate (CARAFATE) 1 g/10 mL suspension Not Taking  No No   Sig: Take 10 mL (1 g total) by mouth 4 (four) times a day as needed (abdominal pain) for up to 10 days   Patient not taking: Reported on 1/16/2024   traZODone (DESYREL) 150 mg tablet   Yes Yes   Sig: Take 150 mg by mouth daily at bedtime      Facility-Administered Medications: None       Past Medical History:   Diagnosis Date    Asthma     Bipolar disorder (HCC)        Past Surgical History:   Procedure Laterality Date    APPENDECTOMY      TONSILLECTOMY         History reviewed. No pertinent family history.  I have reviewed and agree with the history as documented.    E-Cigarette/Vaping    E-Cigarette Use Current Every Day User      E-Cigarette/Vaping Substances     Social History     Tobacco Use    Smoking status: Every Day     Current packs/day: 0.25     Types: Cigarettes    Smokeless tobacco: Never   Vaping Use    Vaping status: Every Day   Substance Use Topics    Alcohol use: Yes    Drug use: Yes     Types: Marijuana       Review of Systems   Constitutional:  Negative for chills and fever.   HENT:  Positive for congestion. Negative for rhinorrhea and sore throat.    Respiratory:  Negative for cough and shortness of breath.    Cardiovascular:  Negative for chest pain and palpitations.   Gastrointestinal:  Positive for abdominal pain, nausea and vomiting. Negative for diarrhea.   Genitourinary:  Negative for dysuria and hematuria.   Musculoskeletal:  Positive for myalgias. Negative for  back pain and neck pain.   Neurological:  Negative for weakness, light-headedness, numbness and headaches.   All other systems reviewed and are negative.      Physical Exam  Physical Exam  Vitals and nursing note reviewed.   Constitutional:       General: He is not in acute distress.     Appearance: Normal appearance. He is normal weight.   HENT:      Head: Normocephalic and atraumatic.      Right Ear: External ear normal.      Left Ear: External ear normal.      Nose: Nose normal.      Mouth/Throat:      Mouth: Mucous membranes are moist.      Pharynx: Oropharynx is clear. No oropharyngeal exudate or posterior oropharyngeal erythema.   Eyes:      Extraocular Movements: Extraocular movements intact.      Conjunctiva/sclera: Conjunctivae normal.      Pupils: Pupils are equal, round, and reactive to light.   Cardiovascular:      Rate and Rhythm: Normal rate and regular rhythm.      Pulses: Normal pulses.      Heart sounds: Normal heart sounds.   Pulmonary:      Effort: Pulmonary effort is normal. No respiratory distress.      Breath sounds: Normal breath sounds. No wheezing or rales.   Abdominal:      General: Abdomen is flat. Bowel sounds are normal. There is no distension.      Palpations: Abdomen is soft.      Tenderness: There is no abdominal tenderness. There is no right CVA tenderness, left CVA tenderness or guarding.      Comments: Generalized abdominal discomfort without focal tenderness.  No guarding or rebound.   Musculoskeletal:         General: No swelling or tenderness. Normal range of motion.      Cervical back: Normal range of motion and neck supple. No tenderness.   Skin:     General: Skin is warm and dry.   Neurological:      General: No focal deficit present.      Mental Status: He is alert and oriented to person, place, and time.         Vital Signs  ED Triage Vitals   Temperature Pulse Respirations Blood Pressure SpO2   01/16/24 0514 01/16/24 0514 01/16/24 0514 01/16/24 0514 01/16/24 0514   98.2 °F  (36.8 °C) 81 16 108/68 100 %      Temp Source Heart Rate Source Patient Position - Orthostatic VS BP Location FiO2 (%)   01/16/24 0514 01/16/24 0514 01/16/24 0514 01/16/24 0514 --   Oral Monitor Lying Right arm       Pain Score       01/16/24 0700       No Pain           Vitals:    01/16/24 0514 01/16/24 0700 01/16/24 0810 01/16/24 1539   BP: 108/68 106/57  141/69   Pulse: 81 74 59 (!) 51   Patient Position - Orthostatic VS: Lying Lying Lying Lying         Visual Acuity      ED Medications  Medications   sodium chloride 0.9 % infusion (125 mL/hr Intravenous New Bag 1/16/24 1422)   nicotine (NICODERM CQ) 14 mg/24hr TD 24 hr patch 1 patch (1 patch Transdermal Not Given 1/16/24 1338)   enoxaparin (LOVENOX) subcutaneous injection 40 mg (has no administration in time range)   ondansetron (ZOFRAN) injection 4 mg (4 mg Intravenous Given 1/16/24 1750)   acetaminophen (TYLENOL) tablet 650 mg (has no administration in time range)   metroNIDAZOLE (FLAGYL) IVPB (premix) 500 mg 100 mL (500 mg Intravenous New Bag 1/16/24 1337)   cefTRIAXone (ROCEPHIN) IVPB (premix in dextrose) 1,000 mg 50 mL (1,000 mg Intravenous New Bag 1/16/24 1423)   oseltamivir (TAMIFLU) capsule 75 mg (75 mg Oral Given 1/16/24 1444)   benzonatate (TESSALON PERLES) capsule 100 mg (100 mg Oral Given 1/16/24 1912)   potassium chloride 20 mEq IVPB (premix) (has no administration in time range)   morphine injection 4 mg (has no administration in time range)   diphenhydrAMINE (BENADRYL) injection 25 mg (has no administration in time range)   sodium chloride 0.9 % bolus 1,000 mL (0 mL Intravenous Stopped 1/16/24 0640)   ondansetron (ZOFRAN) injection 4 mg (4 mg Intravenous Given 1/16/24 0538)   ondansetron (ZOFRAN) injection 4 mg (4 mg Intravenous Given 1/16/24 0543)   iohexol (OMNIPAQUE) 350 MG/ML injection (SINGLE-DOSE) 100 mL (100 mL Intravenous Given 1/16/24 7251)   magnesium sulfate 2 g/50 mL IVPB (premix) 2 g (0 g Intravenous Stopped 1/16/24 7036)   potassium  phosphates 30 mmol in sodium chloride 0.9 % 250 mL infusion (0 mmol Intravenous Stopped 1/16/24 1416)   droperidol (INAPSINE) injection 0.625 mg (0.625 mg Intravenous Given 1/16/24 0631)   droperidol (INAPSINE) injection 0.625 mg (0.625 mg Intravenous Given 1/16/24 0729)   metoclopramide (REGLAN) injection 10 mg (10 mg Intravenous Given 1/16/24 2019)       Diagnostic Studies  Results Reviewed       Procedure Component Value Units Date/Time    Rapid drug screen, urine [[id number]]  (Abnormal) Collected: 01/16/24 1427    Lab Status: Final result Specimen: Urine, Clean Catch Updated: 01/16/24 1452     Amph/Meth UR Negative     Barbiturate Ur Negative     Benzodiazepine Urine Negative     Cocaine Urine Negative     Methadone Urine Negative     Opiate Urine Negative     PCP Ur Negative     THC Urine Positive     Oxycodone Urine Negative    Narrative:      Presumptive report. If requested, specimen will be sent to reference lab for confirmation.  FOR MEDICAL PURPOSES ONLY.   IF CONFIRMATION NEEDED PLEASE CONTACT THE LAB WITHIN 5 DAYS.    Drug Screen Cutoff Levels:  AMPHETAMINE/METHAMPHETAMINES  1000 ng/mL  BARBITURATES     200 ng/mL  BENZODIAZEPINES     200 ng/mL  COCAINE      300 ng/mL  METHADONE      300 ng/mL  OPIATES      300 ng/mL  PHENCYCLIDINE     25 ng/mL  THC       50 ng/mL  OXYCODONE      100 ng/mL    Stool Enteric Bacterial Panel by PCR [[id number]]     Lab Status: No result Specimen: Stool     Clostridium difficile toxin by PCR with EIA [[id number]]     Lab Status: No result Specimen: Stool     Comprehensive metabolic panel [[id number]]  (Abnormal) Collected: 01/16/24 0532    Lab Status: Final result Specimen: Blood from Arm, Left Updated: 01/16/24 0606     Sodium 137 mmol/L      Potassium 3.1 mmol/L      Chloride 102 mmol/L      CO2 20 mmol/L      ANION GAP 15 mmol/L      BUN 13 mg/dL      Creatinine 0.76 mg/dL      Glucose 97 mg/dL      Calcium 9.1 mg/dL      AST 26 U/L      ALT 18 U/L      Alkaline Phosphatase  42 U/L      Total Protein 7.0 g/dL      Albumin 3.8 g/dL      Total Bilirubin 0.67 mg/dL      eGFR 121 ml/min/1.73sq m     Narrative:      [employer] guidelines for Chronic Kidney Disease (CKD):     Stage 1 with normal or high GFR (GFR > 90 mL/min/1.73 square meters)    Stage 2 Mild CKD (GFR = 60-89 mL/min/1.73 square meters)    Stage 3A Moderate CKD (GFR = 45-59 mL/min/1.73 square meters)    Stage 3B Moderate CKD (GFR = 30-44 mL/min/1.73 square meters)    Stage 4 Severe CKD (GFR = 15-29 mL/min/1.73 square meters)    Stage 5 End Stage CKD (GFR <15 mL/min/1.73 square meters)  Note: GFR calculation is accurate only with a steady state creatinine    Lipase [[id number]]  (Normal) Collected: 01/16/24 0532    Lab Status: Final result Specimen: Blood from Arm, Left Updated: 01/16/24 0606     Lipase 23 u/L     Magnesium [[id number]]  (Abnormal) Collected: 01/16/24 0532    Lab Status: Final result Specimen: Blood from Arm, Left Updated: 01/16/24 0606     Magnesium 1.8 mg/dL     Phosphorus [[id number]]  (Abnormal) Collected: 01/16/24 0532    Lab Status: Final result Specimen: Blood from Arm, Left Updated: 01/16/24 0606     Phosphorus 1.1 mg/dL     CBC and differential [[id number]]  (Abnormal) Collected: 01/16/24 0532    Lab Status: Final result Specimen: Blood from Arm, Left Updated: 01/16/24 0603     WBC 5.65 Thousand/uL      RBC 4.84 Million/uL      Hemoglobin 15.0 g/dL      Hematocrit 40.9 %      MCV 85 fL      MCH 31.0 pg      MCHC 36.7 g/dL      RDW 11.0 %      MPV 10.0 fL      Platelets 184 Thousands/uL     Narrative:      See Manual Diff Done Within 3 Days  This is an appended report.  These results have been appended to a previously verified report.                   CT abdomen pelvis with contrast   Final Result by Yuly Silveira MD (01/16 0643)      Possible colitis, as described above. Please see discussion. Clinical correlation recommended. No bowel obstruction. Prior appendectomy.       Borderline splenomegaly.            Workstation performed: [id number]                    Procedures  Procedures         ED Course  ED Course as of 01/16/24 2035   Tue Jan 16, 2024   0534 Procedure Note: EKG  Date/Time: 01/16/24 5:31 AM   Interpreted by: Marvin Mcfadden MD  Indications / Diagnosis: N/V  ECG reviewed by me, the ED Physician: yes   The EKG demonstrates:  Rhythm: sinus bradycardia 54 bpm  Intervals: normal intervals  Axis: normal axis  QRS/Blocks: normal QRS  ST Changes: Benign early repolarization, nonspecific T wave changes.  No STEMI.  No significant change compared to ECG from 1/15/2024.   0548 WBC: 5.65   0548 Hemoglobin: 15.0   0548 Platelet Count: 184   0705 CT abdomen pelvis with contrast  Possible colitis, as described above. Please see discussion. Clinical correlation recommended. No bowel obstruction. Prior appendectomy.     Borderline splenomegaly.                                             Medical Decision Making  32 y/o male with hx of bipolar disorder and asthma presents to the ED via EMS from home for evaluation of persistent nausea and vomiting.  The patient started experiencing viral URI symptoms 4-5 days ago and on 1/12/2024 was seen in the ER and diagnosed with influenza A.  He was seen in the ER yesterday for his influenza symptoms as well as generalized abdominal pain, nausea, and vomiting.  He has had similar episodes of nausea and vomiting in the past due to suspected cannabinoid hyperemesis and was treated symptomatically.  At that time he was also diagnosed with hypokalemia which was repleted.  He was discharged home, however he states that he has still had persistent nausea and vomiting with generalized abdominal discomfort and is now experiencing intermittent bilateral upper extremity and lower extremity paresthesias and cramping.  He tried to take his antiemetic medications at home but vomited immediately, so he came to the ER for evaluation via EMS.  He states that he last  used marijuana 4 to 5 days ago.  He denies any new foods or medications.    Vital signs reviewed.  See physical exam documentation for exam findings.  Differential diagnosis includes but is not limited to cannabinoid hyperemesis, gastritis, reflux, biliary disease, pancreatitis, and/or viral GI illness.  Will treat symptomatically.  Labs and CT abdomen/pelvis ordered due to persistence of symptoms as well as generalized abdominal discomfort.  See ED course for independent interpretation of results. CT shows possible colitis, but otherwise nothing else. K 3.1, Phos 1.1, Mag 1.8. Repleting IV, however it will take about 6+ hours and he is still experiencing N/V and inability to tolerate oral intake despite multiple doses of Zofran and Droperidol.  Will repeat droperidol and attempt to oral challenge, however I anticipate that the patient may require admission due to intractable nausea and vomiting as well as needing IV repletion of his multiple electrolyte disturbances.  Care for the patient was signed out at end of shift to Dr. Mendiola prior to final disposition.  The patient was subsequently treated and reevaluated, still not tolerating oral intake and was admitted to hospitalist service.    Amount and/or Complexity of Data Reviewed  Labs: ordered. Decision-making details documented in ED Course.  Radiology: ordered. Decision-making details documented in ED Course.    Risk  Prescription drug management.  Decision regarding hospitalization.             Disposition  Final diagnoses:   Nausea and vomiting   Influenza   Hypophosphatemia   Hypokalemia   Hypomagnesemia     Time reflects when diagnosis was documented in both MDM as applicable and the Disposition within this note       Time User Action Codes Description Comment    1/16/2024  6:27 AM Marvin Mcfadden [R11.2] Nausea and vomiting     1/16/2024  6:27 AM Marvin Mcfadden [J11.1] Influenza     1/16/2024  6:27 AM Marvin Mcfadden [E83.39] Hypophosphatemia      1/16/2024  6:27 AM Marvin Mcfadden Add [E87.6] Hypokalemia     1/16/2024  6:28 AM Marvin Mcfadden Add [E83.42] Hypomagnesemia           ED Disposition       ED Disposition   Admit    Condition   Stable    Date/Time   Tue Jan 16, 2024  9:56 AM    Comment   Case was discussed with Benedicto and the patient's admission status was agreed to be Admission Status: observation status to the service of Dr. Diane .               Follow-up Information    None         Current Discharge Medication List        CONTINUE these medications which have NOT CHANGED    Details   traZODone (DESYREL) 150 mg tablet Take 150 mg by mouth daily at bedtime      famotidine (PEPCID) 20 mg tablet Take 1 tablet (20 mg total) by mouth 2 (two) times a day for 10 days  Qty: 20 tablet, Refills: 0    Associated Diagnoses: Nausea and vomiting      metoclopramide (Reglan) 10 mg tablet Take 1 tablet (10 mg total) by mouth every 6 (six) hours  Qty: 15 tablet, Refills: 0    Associated Diagnoses: Nausea and vomiting, intractability of vomiting not specified, unspecified vomiting type      OLANZapine (ZyPREXA) 10 mg tablet Take 10 mg by mouth daily at bedtime      omeprazole (PriLOSEC) 20 mg delayed release capsule Take 20 mg by mouth daily      ondansetron (Zofran ODT) 4 mg disintegrating tablet Take 1 tablet (4 mg total) by mouth every 8 (eight) hours as needed for nausea for up to 3 days  Qty: 5 tablet, Refills: 0    Associated Diagnoses: Nausea and vomiting      ondansetron (ZOFRAN) 4 mg tablet Take 1 tablet (4 mg total) by mouth every 6 (six) hours  Qty: 12 tablet, Refills: 0    Associated Diagnoses: Nausea      sucralfate (CARAFATE) 1 g/10 mL suspension Take 10 mL (1 g total) by mouth 4 (four) times a day as needed (abdominal pain) for up to 10 days  Qty: 100 mL, Refills: 0    Associated Diagnoses: Nausea and vomiting             No discharge procedures on file.    PDMP Review       None            ED Provider  Electronically Signed  by             Marvin Mcfadden MD  01/16/24 2035

## 2024-01-16 NOTE — ED NOTES
Pt currently vomiting at side of bed in emesis bag; provider aware       Melani Matt RN  01/16/24 0916       Melani Matt RN  01/16/24 0954

## 2024-01-16 NOTE — ED NOTES
Ambulated to and from bathroom without distress.   Drinking water at this time.      Janessa Mike RN  01/16/24 0939

## 2024-01-16 NOTE — ASSESSMENT & PLAN NOTE
intractable vomiting during secondary to colitis with influenza A positive,   Give IV fluid hydration and IV antiemetics

## 2024-01-16 NOTE — ASSESSMENT & PLAN NOTE
Patient presents with nausea vomiting and diarrhea and epigastric pain,  CT of the abdomen and pelvis with contrast was reviewed shows possible colitis,  Will IV hydration and IV antibiotic therapy with Flagyl and Rocephin, will check inflammatory markers,  Also patient admits to smoke marijuana and intractable vomiting could be related to THC hyperemesis

## 2024-01-17 LAB
ANION GAP SERPL CALCULATED.3IONS-SCNC: 12 MMOL/L
BUN SERPL-MCNC: 8 MG/DL (ref 5–25)
CALCIUM SERPL-MCNC: 8.4 MG/DL (ref 8.4–10.2)
CHLORIDE SERPL-SCNC: 104 MMOL/L (ref 96–108)
CO2 SERPL-SCNC: 22 MMOL/L (ref 21–32)
CREAT SERPL-MCNC: 0.7 MG/DL (ref 0.6–1.3)
GFR SERPL CREATININE-BSD FRML MDRD: 125 ML/MIN/1.73SQ M
GLUCOSE SERPL-MCNC: 80 MG/DL (ref 65–140)
MAGNESIUM SERPL-MCNC: 2.1 MG/DL (ref 1.9–2.7)
PHOSPHATE SERPL-MCNC: 2.6 MG/DL (ref 2.7–4.5)
POTASSIUM SERPL-SCNC: 2.9 MMOL/L (ref 3.5–5.3)
SODIUM SERPL-SCNC: 138 MMOL/L (ref 135–147)

## 2024-01-17 PROCEDURE — 80048 BASIC METABOLIC PNL TOTAL CA: CPT | Performed by: INTERNAL MEDICINE

## 2024-01-17 PROCEDURE — 84100 ASSAY OF PHOSPHORUS: CPT | Performed by: INTERNAL MEDICINE

## 2024-01-17 PROCEDURE — 83735 ASSAY OF MAGNESIUM: CPT | Performed by: INTERNAL MEDICINE

## 2024-01-17 PROCEDURE — 99232 SBSQ HOSP IP/OBS MODERATE 35: CPT | Performed by: NURSE PRACTITIONER

## 2024-01-17 PROCEDURE — 93005 ELECTROCARDIOGRAM TRACING: CPT

## 2024-01-17 RX ORDER — METRONIDAZOLE 500 MG/100ML
500 INJECTION, SOLUTION INTRAVENOUS EVERY 8 HOURS
Status: DISCONTINUED | OUTPATIENT
Start: 2024-01-17 | End: 2024-01-18 | Stop reason: HOSPADM

## 2024-01-17 RX ORDER — METOCLOPRAMIDE HYDROCHLORIDE 5 MG/ML
10 INJECTION INTRAMUSCULAR; INTRAVENOUS ONCE
Status: COMPLETED | OUTPATIENT
Start: 2024-01-17 | End: 2024-01-17

## 2024-01-17 RX ORDER — METOCLOPRAMIDE HYDROCHLORIDE 5 MG/ML
10 INJECTION INTRAMUSCULAR; INTRAVENOUS EVERY 6 HOURS PRN
Status: DISCONTINUED | OUTPATIENT
Start: 2024-01-17 | End: 2024-01-18 | Stop reason: HOSPADM

## 2024-01-17 RX ORDER — OXYCODONE HYDROCHLORIDE 5 MG/1
5 TABLET ORAL EVERY 4 HOURS PRN
Status: DISCONTINUED | OUTPATIENT
Start: 2024-01-17 | End: 2024-01-18 | Stop reason: HOSPADM

## 2024-01-17 RX ORDER — POTASSIUM CHLORIDE 20 MEQ/1
40 TABLET, EXTENDED RELEASE ORAL 2 TIMES DAILY
Status: DISCONTINUED | OUTPATIENT
Start: 2024-01-17 | End: 2024-01-18 | Stop reason: HOSPADM

## 2024-01-17 RX ORDER — METRONIDAZOLE 500 MG/1
500 TABLET ORAL EVERY 8 HOURS SCHEDULED
Status: DISCONTINUED | OUTPATIENT
Start: 2024-01-17 | End: 2024-01-17

## 2024-01-17 RX ORDER — METOCLOPRAMIDE HYDROCHLORIDE 5 MG/ML
10 INJECTION INTRAMUSCULAR; INTRAVENOUS EVERY 6 HOURS PRN
Status: DISCONTINUED | OUTPATIENT
Start: 2024-01-17 | End: 2024-01-17

## 2024-01-17 RX ADMIN — SODIUM CHLORIDE 125 ML/HR: 0.9 INJECTION, SOLUTION INTRAVENOUS at 04:20

## 2024-01-17 RX ADMIN — METOCLOPRAMIDE HYDROCHLORIDE 10 MG: 5 INJECTION INTRAMUSCULAR; INTRAVENOUS at 07:32

## 2024-01-17 RX ADMIN — CEFTRIAXONE 1000 MG: 1 INJECTION, SOLUTION INTRAVENOUS at 11:42

## 2024-01-17 RX ADMIN — MORPHINE SULFATE 4 MG: 4 INJECTION INTRAVENOUS at 04:11

## 2024-01-17 RX ADMIN — METRONIDAZOLE 500 MG: 500 INJECTION, SOLUTION INTRAVENOUS at 06:07

## 2024-01-17 RX ADMIN — MORPHINE SULFATE 4 MG: 4 INJECTION INTRAVENOUS at 20:34

## 2024-01-17 RX ADMIN — POTASSIUM CHLORIDE 40 MEQ: 1500 TABLET, EXTENDED RELEASE ORAL at 17:24

## 2024-01-17 RX ADMIN — ONDANSETRON 4 MG: 2 INJECTION INTRAMUSCULAR; INTRAVENOUS at 03:57

## 2024-01-17 RX ADMIN — METRONIDAZOLE 500 MG: 500 INJECTION, SOLUTION INTRAVENOUS at 22:16

## 2024-01-17 RX ADMIN — MORPHINE SULFATE 4 MG: 4 INJECTION INTRAVENOUS at 15:02

## 2024-01-17 RX ADMIN — SODIUM CHLORIDE 125 ML/HR: 0.9 INJECTION, SOLUTION INTRAVENOUS at 11:42

## 2024-01-17 RX ADMIN — METRONIDAZOLE 500 MG: 500 TABLET ORAL at 21:43

## 2024-01-17 RX ADMIN — METRONIDAZOLE 500 MG: 500 INJECTION, SOLUTION INTRAVENOUS at 12:33

## 2024-01-17 RX ADMIN — ONDANSETRON 4 MG: 2 INJECTION INTRAMUSCULAR; INTRAVENOUS at 22:56

## 2024-01-17 RX ADMIN — MORPHINE SULFATE 4 MG: 4 INJECTION INTRAVENOUS at 10:15

## 2024-01-17 RX ADMIN — SODIUM CHLORIDE 125 ML/HR: 0.9 INJECTION, SOLUTION INTRAVENOUS at 22:14

## 2024-01-17 RX ADMIN — OXYCODONE HYDROCHLORIDE 5 MG: 5 TABLET ORAL at 19:40

## 2024-01-17 RX ADMIN — OSELTAMIVIR PHOSPHATE 75 MG: 75 CAPSULE ORAL at 20:34

## 2024-01-17 RX ADMIN — OSELTAMIVIR PHOSPHATE 75 MG: 75 CAPSULE ORAL at 10:04

## 2024-01-17 NOTE — PROGRESS NOTES
[facility]  Progress Note  Name: Edward L Reyes I  MRN: [id number]  Unit/Bed#: [facility] GOMEZ Date of Admission: 1/16/2024   Date of Service: 1/17/2024 I Hospital Day: 0    Assessment/Plan   * Colitis  Assessment & Plan  Patient presents with nausea vomiting diarrhea and epigastric pain secondary to infectious colitis. Likely viral but has improved on abx and IVF  CT of the abdomen and pelvis with contrast was reviewed shows possible colitis  Continue IV hydration and IV antibiotic therapy with Flagyl and Rocephin  Day #2  Can likely transition to oral abx tomorrow and then home   Improved today and he is tolerating his diet today     Influenza A  Assessment & Plan  Presents with low-grade fever ,cough, nausea ,vomiting ,and diarrhea and flulike symptoms  Influenza A tested positive on 1/12/24  Continue Tamiflu due to persistent symptoms  Day #2 of treatment     Hypokalemia  Assessment & Plan  Replaced     Intractable vomiting with nausea  Assessment & Plan  Likely due to influenza  Improved with supportive measures    Tetrahydrocannabinol (THC) use disorder, mild, abuse  Assessment & Plan    Advised about THC cessation               VTE Pharmacologic Prophylaxis:   Low Risk (Score 0-2) - Encourage Ambulation.    Mobility:   Basic Mobility Inpatient Raw Score: 24  JH-HLM Goal: 8: Walk 250 feet or more  JH-HLM Achieved: 8: Walk 250 feet ot more  HLM Goal achieved. Continue to encourage appropriate mobility.    Patient Centered Rounds: I performed bedside rounds with nursing staff today.   Discussions with Specialists or Other Care Team Provider: Primary RN    Education and Discussions with Family / Patient: patient updated and denies needing a call to family     This time was spent on one or more of the following: performing physical exam; counseling and coordination of care; obtaining or reviewing history; documenting in the medical record; reviewing/ordering tests, medications or procedures;  communicating with other healthcare professionals and discussing with patient's family/caregivers.    Current Length of Stay: 0 day(s)  Current Patient Status: Observation   Certification Statement: The patient, admitted on an observation basis, will now require > 2 midnight hospital stay due to influenza A and symptoms   Discharge Plan: Anticipate discharge tomorrow to home.    Code Status: Level 1 - Full Code    Subjective:   Feels a little better today. Still with abdominal pain and diarrhea. Had one stool today. No vomiting. No fever or chills. Tolerating his diet today. Coughing a lot but he does not need cough syrup.     Objective:     Vitals:   Temp (24hrs), Av.3 °F (36.8 °C), Min:98.1 °F (36.7 °C), Max:98.4 °F (36.9 °C)    Temp:  [98.1 °F (36.7 °C)-98.4 °F (36.9 °C)] 98.1 °F (36.7 °C)  HR:  [60-66] 60  Resp:  [16-19] 16  BP: (120-142)/(76-79) 142/79  SpO2:  [97 %-99 %] 97 %  Body mass index is 21.87 kg/m².     Input and Output Summary (last 24 hours):   No intake or output data in the 24 hours ending 24 1635    Physical Exam:   Physical Exam  Constitutional:       Appearance: He is not ill-appearing.   HENT:      Nose: Nose normal.      Mouth/Throat:      Mouth: Mucous membranes are moist.   Cardiovascular:      Rate and Rhythm: Normal rate and regular rhythm.      Pulses: Normal pulses.      Heart sounds: Normal heart sounds.   Pulmonary:      Effort: Pulmonary effort is normal.      Breath sounds: Normal breath sounds.   Abdominal:      General: Abdomen is flat.      Palpations: Abdomen is soft.   Musculoskeletal:         General: No swelling. Normal range of motion.   Skin:     General: Skin is warm and dry.      Capillary Refill: Capillary refill takes less than 2 seconds.   Neurological:      General: No focal deficit present.      Mental Status: He is oriented to person, place, and time.   Psychiatric:         Mood and Affect: Mood normal.         Behavior: Behavior normal.             Additional Data:     Labs:  Results from last 7 days   Lab Units 01/16/24  0532 01/15/24  0023   WBC Thousand/uL 5.65 5.23   HEMOGLOBIN g/dL 15.0 16.5   HEMATOCRIT % 40.9 45.8   PLATELETS Thousands/uL 184 185   BANDS PCT %  --  5   LYMPHO PCT %  --  19   MONO PCT %  --  6   EOS PCT %  --  0     Results from last 7 days   Lab Units 01/17/24  0430 01/16/24  0532   SODIUM mmol/L 138 137   POTASSIUM mmol/L 2.9* 3.1*   CHLORIDE mmol/L 104 102   CO2 mmol/L 22 20*   BUN mg/dL 8 13   CREATININE mg/dL 0.70 0.76   ANION GAP mmol/L 12 15   CALCIUM mg/dL 8.4 9.1   ALBUMIN g/dL  --  3.8   TOTAL BILIRUBIN mg/dL  --  0.67   ALK PHOS U/L  --  42   ALT U/L  --  18   AST U/L  --  26   GLUCOSE RANDOM mg/dL 80 97                       Lines/Drains:  Invasive Devices       Peripheral Intravenous Line  Duration             Peripheral IV 01/16/24 Distal;Left;Upper;Ventral (anterior) Arm 1 day                          Imaging: Reviewed radiology reports from this admission including: abdominal/pelvic CT    Recent Cultures (last 7 days):         Last 24 Hours Medication List:   Current Facility-Administered Medications   Medication Dose Route Frequency Provider Last Rate    acetaminophen  650 mg Oral Q6H PRN Jackelyn Diane MD      benzonatate  100 mg Oral TID PRN Jackelyn Diane MD      cefTRIAXone  1,000 mg Intravenous Q24H Jackelyn Diane MD 1,000 mg (01/17/24 1142)    enoxaparin  40 mg Subcutaneous Q24H [facility] Jackelyn Diane MD      metroNIDAZOLE  500 mg Intravenous Q8H Jackelyn Diane  mg (01/17/24 1233)    morphine injection  4 mg Intravenous Q4H PRN Anamika Myrick MD      nicotine  1 patch Transdermal Daily Jackelyn Diane MD      ondansetron  4 mg Intravenous Q6H PRN Jackelyn Diane MD      oseltamivir  75 mg Oral Q12H [facility] Jackelyn Diane MD      oxyCODONE  5 mg Oral Q4H PRN MAKI Disla      sodium chloride  125  mL/hr Intravenous Continuous Jackelyn Diane  mL/hr (01/17/24 1142)        Today, Patient Was Seen By: MAKI Disla    **Please Note: This note may have been constructed using a voice recognition system.**

## 2024-01-17 NOTE — UTILIZATION REVIEW
Initial Clinical Review    Admission: Date/Time/Statement:   Admission Orders (From admission, onward)       Ordered        01/16/24 0957  Place in Observation  Once                          Orders Placed This Encounter   Procedures    Place in Observation     Standing Status:   Standing     Number of Occurrences:   1     Order Specific Question:   Level of Care     Answer:   Med Surg [16]     ED Arrival Information       Expected   -    Arrival   1/16/2024 05:11    Acuity   Less Urgent              Means of arrival   Ambulance    Escorted by   [facility] [place]   Hospitalist    Admission type   Emergency              Arrival complaint   vomiting             Chief Complaint   Patient presents with    Vomiting     Pt states he was seen here yesterday for vomiting, dx w/ flu and hypokalemia, states called ems due to hand and leg numbness/ cramping And worsening vomiting. No meds PTA.        Initial Presentation: 33 y.o. male presents to ed from home via ems  for evaluation and treatment of nausea and vomiting. Diagnosed with influenza A on 1-12. Evaluated previously for nausea and vomiting with suspected cannabinoid hyperemesis.  Today he reports persistent nausea and vomiting and intermittent upper and lower extremity paresthesias and cramps. Diarrhea 3-4 x daily. Last used marijuana 4-5 days ago. Clinical assessment significant for generalized abdominal discomfort. K 3.1, MAG 1.8, PHOS 1.1, INFLUENZA A, UDS + THC.    Imaging with possible colitis.  Initially treated with iv zofran x2, iv .9% ns bolus 1L x 1, iv demerol x2, iv K phos x1, iv Mag x1, iv flagyl x1, iv ceftriaxone x1, tamiflu, iv .9% ns 125 hr x1. Admit to observation for colitis.     Date: 1- 17-24    Day 2: observation  Still with abdominal pain and diarrhea.  One stool today. Tolerating diet but coughing a lot.  Continue iv fluids, iv flagyl and  check  inflammatory markers. Nausea and vomiting related to THC. Tamiflu for Influenza  A.anticipate change to oral medication 1- 18.        ED Triage Vitals   01/16/24 0514 01/16/24 0514 01/16/24 0514 01/16/24 0514 01/16/24 0514   98.2 °F (36.8 °C) 81 16 108/68 100 %      Oral Monitor         Pain Score       No Pain          01/16/24 67.2 kg (148 lb 2 oz)     Additional Vital Signs:     Date/Time Temp Pulse Resp BP MAP (mmHg) SpO2 O2 Device   01/16/24 2333 98.4 °F (36.9 °C) 60 18 120/76 101 99 % None (Room air)   01/16/24 2107 98.4 °F (36.9 °C) 66 19 123/78 -- 97 % None (Room air)   01/16/24 1539 97.9 °F (36.6 °C) 51 Abnormal  16 141/69 93 96 % None (Room air)   01/16/24 0514 98.2 °F (36.8 °C) 81 16 108/68 -- 100 % None (Room air)       Pertinent Labs/Diagnostic Test Results:     CT abdomen pelvis with contrast   Final (01/16 0643)      Possible colitis, as described above. Please see discussion. Clinical correlation recommended. No bowel obstruction. Prior appendectomy.      Borderline splenomegaly.        Results from last 7 days   Lab Units 01/12/24 2020   SARS-COV-2  Negative     Results from last 7 days   Lab Units 01/16/24  0532 01/15/24  0023   WBC Thousand/uL 5.65 5.23   HEMOGLOBIN g/dL 15.0 16.5   HEMATOCRIT % 40.9 45.8   PLATELETS Thousands/uL 184 185   BANDS PCT %  --  5         Results from last 7 days   Lab Units 01/17/24  0430 01/16/24  0532 01/15/24  0023   SODIUM mmol/L 138 137 138   POTASSIUM mmol/L 2.9* 3.1* 2.9*   CHLORIDE mmol/L 104 102 102   CO2 mmol/L 22 20* 21   ANION GAP mmol/L 12 15 15   BUN mg/dL 8 13 16   CREATININE mg/dL 0.70 0.76 0.89   EGFR ml/min/1.73sq m 125 121 113   CALCIUM mg/dL 8.4 9.1 9.4   MAGNESIUM mg/dL 2.1 1.8* 1.7*   PHOSPHORUS mg/dL 2.6* 1.1*  --      Results from last 7 days   Lab Units 01/16/24  0532 01/15/24  0023   AST U/L 26 16   ALT U/L 18 18   ALK PHOS U/L 42 51   TOTAL PROTEIN g/dL 7.0 7.7   ALBUMIN g/dL 3.8 4.1   TOTAL BILIRUBIN mg/dL 0.67 0.76         Results from last 7 days   Lab Units 01/17/24  0430 01/16/24  0532 01/15/24  0023   GLUCOSE  RANDOM mg/dL 80 97 111       Results from last 7 days   Lab Units 01/16/24  0532 01/15/24  0023   LIPASE u/L 23 12     Results from last 7 days   Lab Units 01/12/24 2020   INFLUENZA A PCR  Positive*   INFLUENZA B PCR  Negative   RSV PCR  Negative         Results from last 7 days   Lab Units 01/16/24  1427   AMPH/METH  Negative   BARBITURATE UR  Negative   BENZODIAZEPINE UR  Negative   COCAINE UR  Negative   METHADONE URINE  Negative   OPIATE UR  Negative   PCP UR  Negative   THC UR  Positive*       ED Treatment:   Medication Administration from 01/16/2024 0508 to 01/16/2024 1533         Date/Time Order Dose Route Action     01/16/2024 0534 EST sodium chloride 0.9 % bolus 1,000 mL 1,000 mL Intravenous New Bag     01/16/2024 0538 EST ondansetron (ZOFRAN) injection 4 mg 4 mg Intravenous Given     01/16/2024 0543 EST ondansetron (ZOFRAN) injection 4 mg 4 mg Intravenous Given     01/16/2024 0634 EST magnesium sulfate 2 g/50 mL IVPB (premix) 2 g 2 g Intravenous New Bag     01/16/2024 0807 EST potassium phosphates 30 mmol in sodium chloride 0.9 % 250 mL infusion 30 mmol Intravenous New Bag     01/16/2024 0631 EST droperidol (INAPSINE) injection 0.625 mg 0.625 mg Intravenous Given     01/16/2024 0729 EST droperidol (INAPSINE) injection 0.625 mg 0.625 mg Intravenous Given     01/16/2024 1422 EST sodium chloride 0.9 % infusion 125 mL/hr Intravenous New Bag     01/16/2024 1337 EST metroNIDAZOLE (FLAGYL) IVPB (premix) 500 mg 100 mL 500 mg Intravenous New Bag     01/16/2024 1423 EST cefTRIAXone (ROCEPHIN) IVPB (premix in dextrose) 1,000 mg 50 mL 1,000 mg Intravenous New Bag     01/16/2024 1444 EST oseltamivir (TAMIFLU) capsule 75 mg 75 mg Oral Given          Past Medical History:   Diagnosis Date    Asthma     Bipolar disorder (HCC)      Present on Admission:  **None**      Admitting Diagnosis:     Hypokalemia [E87.6]  Hypomagnesemia [E83.42]  Vomiting [R11.10]  Hypophosphatemia [E83.39]  Influenza [J11.1]  Nausea and vomiting  [R11.2]    Age/Sex: 33 y.o. male    Scheduled Medications:    cefTRIAXone, 1,000 mg, Intravenous, Q24H  enoxaparin, 40 mg, Subcutaneous, Q24H LUI  metroNIDAZOLE, 500 mg, Intravenous, Q8H  nicotine, 1 patch, Transdermal, Daily  oseltamivir, 75 mg, Oral, Q12H LUI      Continuous IV Infusions:  sodium chloride, 125 mL/hr, Intravenous, Continuous      PRN Meds:  acetaminophen, 650 mg, Oral, Q6H PRN  benzonatate, 100 mg, Oral, TID PRN  metoclopramide, 10 mg, Intravenous, Q6H PRN  morphine injection, 4 mg, Intravenous, Q4H PRN  ondansetron, 4 mg, Intravenous, Q6H PRN        None    Network Utilization Review Department  ATTENTION: Please call with any questions or concerns to [phone] and carefully listen to the prompts so that you are directed to the right person. All voicemails are confidential.   For Discharge needs, contact Care Management DC Support Team at [phone] opt. 2  Send all requests for admission clinical reviews, approved or denied determinations and any other requests to dedicated fax number below belonging to the campus where the patient is receiving treatment. List of dedicated fax numbers for the Facilities:  FACILITY NAME UR FAX NUMBER   ADMISSION DENIALS (Administrative/Medical Necessity) [phone]   DISCHARGE SUPPORT TEAM (NETWORK) [phone]   PARENT CHILD HEALTH (Maternity/NICU/Pediatrics) [phone]   [facility] [phone]   [facility] [phone]   [facility] [phone]   [facility] [phone]   [facility] [phone]   [facility] [phone]   [facility] [phone]   [facility] [phone]   [facility] [phone]   [facility] [phone]   [facility] [phone]

## 2024-01-17 NOTE — PROGRESS NOTES
The metronidazole has been converted to Oral per [facility] IV-to-PO Auto-Conversion Protocol for Adults as approved by the Pharmacy and Therapeutics Committee. The patient met all eligible criteria:  1) Age = 18 years old   2) Received at least one dose of the IV form   3) Receiving at least one other scheduled oral/enteral medication   4) Tolerating an oral/enteral diet   and did not have any exclusions:   1) Critical care patient   2) Active GI bleed (IF assessing H2RAs or PPIs)   3) Continuous tube feeding (IF assessing cipro, doxycycline, levofloxacin, minocycline, rifampin, or voriconazole)   4) Receiving PO vancomycin (IF assessing metronidazole)   5) Persistent nausea and/or vomiting   6) Ileus or gastrointestinal obstruction   7) Elías/nasogastric tube set for continuous suction   8) Specific order not to automatically convert to PO (in the order's comments or if discussed in the most recent Infectious Disease or primary team's progress notes).

## 2024-01-17 NOTE — PLAN OF CARE
Problem: PAIN - ADULT  Goal: Verbalizes/displays adequate comfort level or baseline comfort level  Description: Interventions:  - Encourage patient to monitor pain and request assistance  - Assess pain using appropriate pain scale  - Administer analgesics based on type and severity of pain and evaluate response  - Implement non-pharmacological measures as appropriate and evaluate response  - Consider cultural and social influences on pain and pain management  - Notify physician/advanced practitioner if interventions unsuccessful or patient reports new pain  Outcome: Progressing     Problem: INFECTION - ADULT  Goal: Absence of fever/infection during neutropenic period  Description: INTERVENTIONS:  - Monitor WBC    Outcome: Progressing     Problem: GASTROINTESTINAL - ADULT  Goal: Minimal or absence of nausea and/or vomiting  Description: INTERVENTIONS:  - Administer IV fluids if ordered to ensure adequate hydration  - Maintain NPO status until nausea and vomiting are resolved  - Nasogastric tube if ordered  - Administer ordered antiemetic medications as needed  - Provide nonpharmacologic comfort measures as appropriate  - Advance diet as tolerated, if ordered  - Consider nutrition services referral to assist patient with adequate nutrition and appropriate food choices  Outcome: Not Progressing

## 2024-01-17 NOTE — ASSESSMENT & PLAN NOTE
Presents with low-grade fever ,cough, nausea ,vomiting ,and diarrhea and flulike symptoms  Influenza A tested positive on 1/12/24  Continue Tamiflu due to persistent symptoms  Day #2 of treatment

## 2024-01-17 NOTE — PLAN OF CARE
Problem: PAIN - ADULT  Goal: Verbalizes/displays adequate comfort level or baseline comfort level  Description: Interventions:  - Encourage patient to monitor pain and request assistance  - Assess pain using appropriate pain scale  - Administer analgesics based on type and severity of pain and evaluate response  - Implement non-pharmacological measures as appropriate and evaluate response  - Consider cultural and social influences on pain and pain management  - Notify physician/advanced practitioner if interventions unsuccessful or patient reports new pain  Outcome: Progressing     Problem: INFECTION - ADULT  Goal: Absence or prevention of progression during hospitalization  Description: INTERVENTIONS:  - Assess and monitor for signs and symptoms of infection  - Monitor lab/diagnostic results  - Monitor all insertion sites, i.e. indwelling lines, tubes, and drains  - Monitor endotracheal if appropriate and nasal secretions for changes in amount and color  - [place] appropriate cooling/warming therapies per order  - Administer medications as ordered  - Instruct and encourage patient and family to use good hand hygiene technique  - Identify and instruct in appropriate isolation precautions for identified infection/condition  Outcome: Progressing     Problem: SAFETY ADULT  Goal: Patient will remain free of falls  Description: INTERVENTIONS:  - Educate patient/family on patient safety including physical limitations  - Instruct patient to call for assistance with activity   - Consult OT/PT to assist with strengthening/mobility   - Keep Call bell within reach  - Keep bed low and locked with side rails adjusted as appropriate  - Keep care items and personal belongings within reach  - Initiate and maintain comfort rounds  - Make Fall Risk Sign visible to staff  - Offer Toileting every  Hours, in advance of need  - Initiate/Maintain alarm  - Obtain necessary fall risk management equipment  - Apply yellow socks and  bracelet for high fall risk patients  - Consider moving patient to room near nurses station  Outcome: Progressing     Problem: DISCHARGE PLANNING  Goal: Discharge to home or other facility with appropriate resources  Description: INTERVENTIONS:  - Identify barriers to discharge w/patient and caregiver  - Arrange for needed discharge resources and transportation as appropriate  - Identify discharge learning needs (meds, wound care, etc.)  - Arrange for interpretive services to assist at discharge as needed  - Refer to Case Management Department for coordinating discharge planning if the patient needs post-hospital services based on physician/advanced practitioner order or complex needs related to functional status, cognitive ability, or social support system  Outcome: Progressing

## 2024-01-17 NOTE — ASSESSMENT & PLAN NOTE
Patient presents with nausea vomiting diarrhea and epigastric pain secondary to infectious colitis. Likely viral but has improved on abx and IVF  CT of the abdomen and pelvis with contrast was reviewed shows possible colitis  Continue IV hydration and IV antibiotic therapy with Flagyl and Rocephin  Day #2  Can likely transition to oral abx tomorrow and then home   Improved today and he is tolerating his diet today

## 2024-01-18 VITALS
HEIGHT: 69 IN | WEIGHT: 148.13 LBS | OXYGEN SATURATION: 91 % | SYSTOLIC BLOOD PRESSURE: 141 MMHG | BODY MASS INDEX: 21.94 KG/M2 | HEART RATE: 87 BPM | DIASTOLIC BLOOD PRESSURE: 63 MMHG | TEMPERATURE: 98.2 F | RESPIRATION RATE: 20 BRPM

## 2024-01-18 PROBLEM — R11.2 INTRACTABLE VOMITING WITH NAUSEA: Status: RESOLVED | Noted: 2024-01-16 | Resolved: 2024-01-18

## 2024-01-18 LAB
ALBUMIN SERPL BCP-MCNC: 3.3 G/DL (ref 3.5–5)
ALP SERPL-CCNC: 40 U/L (ref 34–104)
ALT SERPL W P-5'-P-CCNC: 16 U/L (ref 7–52)
ANION GAP SERPL CALCULATED.3IONS-SCNC: 8 MMOL/L
AST SERPL W P-5'-P-CCNC: 14 U/L (ref 13–39)
BASOPHILS # BLD MANUAL: 0 THOUSAND/UL (ref 0–0.1)
BASOPHILS NFR MAR MANUAL: 0 % (ref 0–1)
BILIRUB SERPL-MCNC: 0.5 MG/DL (ref 0.2–1)
BUN SERPL-MCNC: 3 MG/DL (ref 5–25)
C COLI+JEJUNI TUF STL QL NAA+PROBE: NORMAL
C DIFF TOX GENS STL QL NAA+PROBE: NEGATIVE
CALCIUM ALBUM COR SERPL-MCNC: 9 MG/DL (ref 8.3–10.1)
CALCIUM SERPL-MCNC: 8.4 MG/DL (ref 8.4–10.2)
CHLORIDE SERPL-SCNC: 103 MMOL/L (ref 96–108)
CO2 SERPL-SCNC: 26 MMOL/L (ref 21–32)
CREAT SERPL-MCNC: 0.74 MG/DL (ref 0.6–1.3)
EC STX1+STX2 GENES STL QL NAA+PROBE: NORMAL
EOSINOPHIL # BLD MANUAL: 0 THOUSAND/UL (ref 0–0.4)
EOSINOPHIL NFR BLD MANUAL: 0 % (ref 0–6)
ERYTHROCYTE [DISTWIDTH] IN BLOOD BY AUTOMATED COUNT: 11.1 % (ref 11.6–15.1)
GFR SERPL CREATININE-BSD FRML MDRD: 122 ML/MIN/1.73SQ M
GLUCOSE SERPL-MCNC: 97 MG/DL (ref 65–140)
HCT VFR BLD AUTO: 36.7 % (ref 36.5–49.3)
HGB BLD-MCNC: 13.1 G/DL (ref 12–17)
LYMPHOCYTES # BLD AUTO: 1.61 THOUSAND/UL (ref 0.6–4.47)
LYMPHOCYTES # BLD AUTO: 30 % (ref 14–44)
MCH RBC QN AUTO: 30.9 PG (ref 26.8–34.3)
MCHC RBC AUTO-ENTMCNC: 35.7 G/DL (ref 31.4–37.4)
MCV RBC AUTO: 87 FL (ref 82–98)
MONOCYTES # BLD AUTO: 0.32 THOUSAND/UL (ref 0–1.22)
MONOCYTES NFR BLD: 7 % (ref 4–12)
NEUTROPHILS # BLD MANUAL: 2.66 THOUSAND/UL (ref 1.85–7.62)
NEUTS SEG NFR BLD AUTO: 58 % (ref 43–75)
PLATELET # BLD AUTO: 175 THOUSANDS/UL (ref 149–390)
PLATELET BLD QL SMEAR: ADEQUATE
PMV BLD AUTO: 9.8 FL (ref 8.9–12.7)
POTASSIUM SERPL-SCNC: 3.1 MMOL/L (ref 3.5–5.3)
PROT SERPL-MCNC: 6.3 G/DL (ref 6.4–8.4)
RBC # BLD AUTO: 4.24 MILLION/UL (ref 3.88–5.62)
RBC MORPH BLD: NORMAL
SALMONELLA SP SPAO STL QL NAA+PROBE: NORMAL
SHIGELLA SP+EIEC IPAH STL QL NAA+PROBE: NORMAL
SODIUM SERPL-SCNC: 137 MMOL/L (ref 135–147)
VARIANT LYMPHS # BLD AUTO: 5 %
WBC # BLD AUTO: 4.59 THOUSAND/UL (ref 4.31–10.16)

## 2024-01-18 PROCEDURE — 85027 COMPLETE CBC AUTOMATED: CPT | Performed by: STUDENT IN AN ORGANIZED HEALTH CARE EDUCATION/TRAINING PROGRAM

## 2024-01-18 PROCEDURE — 87505 NFCT AGENT DETECTION GI: CPT | Performed by: INTERNAL MEDICINE

## 2024-01-18 PROCEDURE — 85007 BL SMEAR W/DIFF WBC COUNT: CPT | Performed by: STUDENT IN AN ORGANIZED HEALTH CARE EDUCATION/TRAINING PROGRAM

## 2024-01-18 PROCEDURE — 87493 C DIFF AMPLIFIED PROBE: CPT | Performed by: INTERNAL MEDICINE

## 2024-01-18 PROCEDURE — 80053 COMPREHEN METABOLIC PANEL: CPT | Performed by: STUDENT IN AN ORGANIZED HEALTH CARE EDUCATION/TRAINING PROGRAM

## 2024-01-18 PROCEDURE — 99239 HOSP IP/OBS DSCHRG MGMT >30: CPT | Performed by: PHYSICIAN ASSISTANT

## 2024-01-18 RX ORDER — CIPROFLOXACIN 500 MG/1
500 TABLET, FILM COATED ORAL EVERY 12 HOURS SCHEDULED
Start: 2024-01-18 | End: 2024-01-19

## 2024-01-18 RX ORDER — CIPROFLOXACIN 500 MG/1
500 TABLET, FILM COATED ORAL EVERY 12 HOURS SCHEDULED
Start: 2024-01-18 | End: 2024-01-18

## 2024-01-18 RX ORDER — METRONIDAZOLE 500 MG/1
500 TABLET ORAL EVERY 8 HOURS SCHEDULED
Start: 2024-01-18 | End: 2024-01-19

## 2024-01-18 RX ORDER — METRONIDAZOLE 500 MG/1
500 TABLET ORAL EVERY 8 HOURS SCHEDULED
Start: 2024-01-18 | End: 2024-01-18

## 2024-01-18 RX ORDER — OSELTAMIVIR PHOSPHATE 75 MG/1
75 CAPSULE ORAL EVERY 12 HOURS SCHEDULED
Start: 2024-01-18 | End: 2024-01-19

## 2024-01-18 RX ADMIN — NICOTINE 1 PATCH: 14 PATCH, EXTENDED RELEASE TRANSDERMAL at 08:44

## 2024-01-18 RX ADMIN — MORPHINE SULFATE 4 MG: 4 INJECTION INTRAVENOUS at 05:45

## 2024-01-18 RX ADMIN — METRONIDAZOLE 500 MG: 500 INJECTION, SOLUTION INTRAVENOUS at 05:46

## 2024-01-18 RX ADMIN — MORPHINE SULFATE 4 MG: 4 INJECTION INTRAVENOUS at 00:45

## 2024-01-18 RX ADMIN — CEFTRIAXONE 1000 MG: 1 INJECTION, SOLUTION INTRAVENOUS at 12:31

## 2024-01-18 RX ADMIN — MORPHINE SULFATE 4 MG: 4 INJECTION INTRAVENOUS at 10:12

## 2024-01-18 RX ADMIN — OSELTAMIVIR PHOSPHATE 75 MG: 75 CAPSULE ORAL at 08:44

## 2024-01-18 RX ADMIN — METOCLOPRAMIDE HYDROCHLORIDE 10 MG: 5 INJECTION INTRAMUSCULAR; INTRAVENOUS at 07:19

## 2024-01-18 RX ADMIN — METOCLOPRAMIDE HYDROCHLORIDE 10 MG: 5 INJECTION INTRAMUSCULAR; INTRAVENOUS at 00:45

## 2024-01-18 RX ADMIN — SODIUM CHLORIDE 125 ML/HR: 0.9 INJECTION, SOLUTION INTRAVENOUS at 08:44

## 2024-01-18 RX ADMIN — POTASSIUM CHLORIDE 40 MEQ: 1500 TABLET, EXTENDED RELEASE ORAL at 08:44

## 2024-01-18 NOTE — ASSESSMENT & PLAN NOTE
Patient presents with nausea, vomiting, diarrhea and epigastric pain secondary to infectious colitis. Likely viral but has improved on abx and IVF  CT of the abdomen and pelvis with contrast was reviewed shows possible colitis  Continue IV hydration and IV antibiotic therapy with Flagyl and Rocephin day #3  Transition to PO Cipro and Flagyl at discharge to complete 10 day course of therapy.  Patient tolerated lunch prior to discharge.

## 2024-01-18 NOTE — PLAN OF CARE
Problem: PAIN - ADULT  Goal: Verbalizes/displays adequate comfort level or baseline comfort level  Description: Interventions:  - Encourage patient to monitor pain and request assistance  - Assess pain using appropriate pain scale  - Administer analgesics based on type and severity of pain and evaluate response  - Implement non-pharmacological measures as appropriate and evaluate response  - Consider cultural and social influences on pain and pain management  - Notify physician/advanced practitioner if interventions unsuccessful or patient reports new pain  Outcome: Progressing     Problem: SAFETY ADULT  Goal: Patient will remain free of falls  Description: INTERVENTIONS:  - Educate patient/family on patient safety including physical limitations  - Instruct patient to call for assistance with activity   - Consult OT/PT to assist with strengthening/mobility   - Keep Call bell within reach  - Keep bed low and locked with side rails adjusted as appropriate  - Keep care items and personal belongings within reach  - Initiate and maintain comfort rounds  - Make Fall Risk Sign visible to staff  - Offer Toileting every 4 Hours, in advance of need  - Apply yellow socks and bracelet for high fall risk patients  - Consider moving patient to room near nurses station  Outcome: Progressing     Problem: GASTROINTESTINAL - ADULT  Goal: Minimal or absence of nausea and/or vomiting  Description: INTERVENTIONS:  - Administer IV fluids if ordered to ensure adequate hydration  - Maintain NPO status until nausea and vomiting are resolved  - Nasogastric tube if ordered  - Administer ordered antiemetic medications as needed  - Provide nonpharmacologic comfort measures as appropriate  - Advance diet as tolerated, if ordered  - Consider nutrition services referral to assist patient with adequate nutrition and appropriate food choices  Outcome: Progressing     Problem: GASTROINTESTINAL - ADULT  Goal: Maintains or returns to baseline bowel  function  Description: INTERVENTIONS:  - Assess bowel function  - Encourage oral fluids to ensure adequate hydration  - Administer IV fluids if ordered to ensure adequate hydration  - Administer ordered medications as needed  - Encourage mobilization and activity  - Consider nutritional services referral to assist patient with adequate nutrition and appropriate food choices  Outcome: Progressing     Problem: Nutrition/Hydration-ADULT  Goal: Nutrient/Hydration intake appropriate for improving, restoring or maintaining nutritional needs  Description: Monitor and assess patient's nutrition/hydration status for malnutrition. Collaborate with interdisciplinary team and initiate plan and interventions as ordered.  Monitor patient's weight and dietary intake as ordered or per policy. Utilize nutrition screening tool and intervene as necessary. Determine patient's food preferences and provide high-protein, high-caloric foods as appropriate.     INTERVENTIONS:  - Monitor oral intake, urinary output, labs, and treatment plans  - Assess nutrition and hydration status and recommend course of action  - Evaluate amount of meals eaten  - Assist patient with eating if necessary   - Allow adequate time for meals  - Recommend/ encourage appropriate diets, oral nutritional supplements, and vitamin/mineral supplements  - Order, calculate, and assess calorie counts as needed  - Recommend, monitor, and adjust tube feedings and TPN/PPN based on assessed needs  - Assess need for intravenous fluids  - Provide specific nutrition/hydration education as appropriate  - Include patient/family/caregiver in decisions related to nutrition  Outcome: Progressing

## 2024-01-18 NOTE — DISCHARGE SUMMARY
[facility]  Discharge- Edward L Reyes 1990, 33 y.o. male MRN: [id number]  Unit/Bed#: [facility] Encounter: [id number]  Primary Care Provider: No primary care provider on file.   Date and time admitted to hospital: 1/16/2024  5:12 AM    * Colitis  Assessment & Plan  Patient presents with nausea, vomiting, diarrhea and epigastric pain secondary to infectious colitis. Likely viral but has improved on abx and IVF  CT of the abdomen and pelvis with contrast was reviewed shows possible colitis  Continue IV hydration and IV antibiotic therapy with Flagyl and Rocephin day #3  Transition to PO Cipro and Flagyl at discharge to complete 10 day course of therapy.  Patient tolerated lunch prior to discharge.    Hypokalemia  Assessment & Plan  Potassium today: 3.1  Received oral supplementation this morning.    Tetrahydrocannabinol (THC) use disorder, mild, abuse  Assessment & Plan  Advised about THC cessation    Influenza A  Assessment & Plan  Presents with low-grade fever ,cough, nausea ,vomiting ,and diarrhea and flulike symptoms  Influenza A tested positive on 1/12/24  Continue Tamiflu due to persistent symptoms  Day #3/5 of treatment       Medical Problems       Resolved Problems  Date Reviewed: 1/18/2024            Resolved    Intractable vomiting with nausea 1/18/2024     Resolved by  Mimi Mccall PA-C        Discharging Physician / Practitioner: Mimi Mccall PA-C  PCP: No primary care provider on file.  Admission Date:   Admission Orders (From admission, onward)       Ordered        01/16/24 0957  Place in Observation  Once                          Discharge Date: 01/18/24    Consultations During Hospital Stay:  None    Procedures Performed:   CT abdomen pelvis (1/16/2024): The wall of the right colon, hepatic flexure and transverse colon appear somewhat thickened, consistent with colitis.    Significant Findings / Test Results:   See above    Incidental Findings:   None    Test Results  "Pending at Discharge (will require follow up):   None     Outpatient Tests Requested:  None    Complications: Electrolyte abnormalities    Reason for Admission: Intractable nausea, vomiting and diarrhea along with abdominal pain    Hospital Course:   Edward L Reyes is a 33 y.o. male patient who originally presented to the hospital on 1/16/2024 due to nausea, vomiting, diarrhea, abdominal pain, generalized weakness and cough along with fever.  He has no significant past medical history.  He was seen in the emergency room for few days prior to his admission and was diagnosed with influenza A.  He was discharged home but returned secondary to progressive dehydration causing symptomatology.  He was treated with IV fluids, antiemetics and IV antibiotics.  He was tolerating a diet prior to discharge.  He will complete a 10-day course of antibiotics for colitis although there could be a possibility of this being viral.  He will complete Tamiflu.  He was encouraged to eat several small meals per day as tolerated and to avoid fatty foods.    Please see above list of diagnoses and related plan for additional information.     Condition at Discharge: stable    Discharge Day Visit / Exam:   Subjective: On the day of discharge the patient is feeling much better overall.  He has been able to tolerate small portions of food and drinks.  He has not had any further episodes of diarrhea.  He still has some intermittent nausea but no significant episodes of vomiting.    Vitals: Blood Pressure: 107/74 (01/18/24 0843)  Pulse: 62 (01/18/24 0843)  Temperature: 98.4 °F (36.9 °C) (01/18/24 0843)  Temp Source: Oral (01/17/24 2316)  Respirations: 18 (01/18/24 0843)  Height: 5' 9\" (175.3 cm) (01/16/24 1539)  Weight - Scale: 67.2 kg (148 lb 2 oz) (01/16/24 1539)  SpO2: 98 % (01/18/24 0020)  Exam:   Physical Exam  Vitals and nursing note reviewed.   Constitutional:       General: He is not in acute distress.     Appearance: He is well-developed. "   HENT:      Head: Normocephalic and atraumatic.   Eyes:      Conjunctiva/sclera: Conjunctivae normal.   Cardiovascular:      Rate and Rhythm: Normal rate and regular rhythm.      Heart sounds: No murmur heard.  Pulmonary:      Effort: Pulmonary effort is normal. No respiratory distress.      Breath sounds: Normal breath sounds.   Abdominal:      Palpations: Abdomen is soft.      Tenderness: There is no abdominal tenderness.   Musculoskeletal:         General: No swelling.      Cervical back: Neck supple.   Skin:     General: Skin is warm and dry.   Neurological:      Mental Status: He is alert.   Psychiatric:         Mood and Affect: Mood normal.        Discussion with Family: Patient declined call to .     Discharge instructions/Information to patient and family:   See after visit summary for information provided to patient and family.      Provisions for Follow-Up Care:  See after visit summary for information related to follow-up care and any pertinent home health orders.      Mobility at time of Discharge:   Basic Mobility Inpatient Raw Score: 24  JH-HLM Goal: 8: Walk 250 feet or more  JH-HLM Achieved: 6: Walk 10 steps or more  HLM Goal achieved. Continue to encourage appropriate mobility.     Disposition:   Home    Planned Readmission: No     Discharge Statement:  I spent 45 minutes discharging the patient. This time was spent on the day of discharge. I had direct contact with the patient on the day of discharge. Greater than 50% of the total time was spent examining patient, answering all patient questions, arranging and discussing plan of care with patient as well as directly providing post-discharge instructions.  Additional time then spent on discharge activities.    Discharge Medications:  See after visit summary for reconciled discharge medications provided to patient and/or family.      **Please Note: This note may have been constructed using a voice recognition system**

## 2024-01-18 NOTE — ASSESSMENT & PLAN NOTE
Presents with low-grade fever ,cough, nausea ,vomiting ,and diarrhea and flulike symptoms  Influenza A tested positive on 1/12/24  Continue Tamiflu due to persistent symptoms  Day #3/5 of treatment

## 2024-01-19 ENCOUNTER — HOSPITAL ENCOUNTER (EMERGENCY)
Facility: HOSPITAL | Age: 34
Discharge: HOME | End: 2024-01-19
Attending: EMERGENCY MEDICINE
Payer: COMMERCIAL

## 2024-01-19 VITALS
RESPIRATION RATE: 16 BRPM | OXYGEN SATURATION: 99 % | HEART RATE: 60 BPM | SYSTOLIC BLOOD PRESSURE: 125 MMHG | DIASTOLIC BLOOD PRESSURE: 68 MMHG | TEMPERATURE: 97.8 F

## 2024-01-19 DIAGNOSIS — J11.1 INFLUENZA: Primary | ICD-10-CM

## 2024-01-19 DIAGNOSIS — F12.188 CANNABIS HYPEREMESIS SYNDROME CONCURRENT WITH AND DUE TO CANNABIS ABUSE (HCC): ICD-10-CM

## 2024-01-19 DIAGNOSIS — R11.2 NAUSEA AND VOMITING: ICD-10-CM

## 2024-01-19 LAB
ALBUMIN SERPL BCP-MCNC: 4 G/DL (ref 3.5–5)
ALP SERPL-CCNC: 43 U/L (ref 34–104)
ALT SERPL W P-5'-P-CCNC: 28 U/L (ref 7–52)
ANION GAP SERPL CALCULATED.3IONS-SCNC: 13 MMOL/L
AST SERPL W P-5'-P-CCNC: 29 U/L (ref 13–39)
ATRIAL RATE: 48 BPM
ATRIAL RATE: 54 BPM
ATRIAL RATE: 61 BPM
BILIRUB SERPL-MCNC: 0.86 MG/DL (ref 0.2–1)
BUN SERPL-MCNC: 4 MG/DL (ref 5–25)
CALCIUM SERPL-MCNC: 9.5 MG/DL (ref 8.4–10.2)
CHLORIDE SERPL-SCNC: 104 MMOL/L (ref 96–108)
CO2 SERPL-SCNC: 24 MMOL/L (ref 21–32)
CREAT SERPL-MCNC: 0.83 MG/DL (ref 0.6–1.3)
ERYTHROCYTE [DISTWIDTH] IN BLOOD BY AUTOMATED COUNT: 10.9 % (ref 11.6–15.1)
GFR SERPL CREATININE-BSD FRML MDRD: 117 ML/MIN/1.73SQ M
GLUCOSE SERPL-MCNC: 109 MG/DL (ref 65–140)
HCT VFR BLD AUTO: 42.5 % (ref 36.5–49.3)
HGB BLD-MCNC: 15.9 G/DL (ref 12–17)
MCH RBC QN AUTO: 31.2 PG (ref 26.8–34.3)
MCHC RBC AUTO-ENTMCNC: 37.4 G/DL (ref 31.4–37.4)
MCV RBC AUTO: 83 FL (ref 82–98)
P AXIS: 35 DEGREES
P AXIS: 51 DEGREES
P AXIS: 60 DEGREES
PLATELET # BLD AUTO: 263 THOUSANDS/UL (ref 149–390)
PMV BLD AUTO: 9.3 FL (ref 8.9–12.7)
POTASSIUM SERPL-SCNC: 2.9 MMOL/L (ref 3.5–5.3)
PR INTERVAL: 126 MS
PR INTERVAL: 142 MS
PR INTERVAL: 158 MS
PROT SERPL-MCNC: 7.4 G/DL (ref 6.4–8.4)
QRS AXIS: 82 DEGREES
QRS AXIS: 91 DEGREES
QRS AXIS: 92 DEGREES
QRSD INTERVAL: 86 MS
QRSD INTERVAL: 92 MS
QRSD INTERVAL: 98 MS
QT INTERVAL: 432 MS
QT INTERVAL: 462 MS
QT INTERVAL: 464 MS
QTC INTERVAL: 412 MS
QTC INTERVAL: 434 MS
QTC INTERVAL: 440 MS
RBC # BLD AUTO: 5.1 MILLION/UL (ref 3.88–5.62)
SODIUM SERPL-SCNC: 141 MMOL/L (ref 135–147)
T WAVE AXIS: 29 DEGREES
T WAVE AXIS: 50 DEGREES
T WAVE AXIS: 87 DEGREES
VENTRICULAR RATE: 48 BPM
VENTRICULAR RATE: 54 BPM
VENTRICULAR RATE: 61 BPM
WBC # BLD AUTO: 6.22 THOUSAND/UL (ref 4.31–10.16)

## 2024-01-19 PROCEDURE — 99284 EMERGENCY DEPT VISIT MOD MDM: CPT

## 2024-01-19 PROCEDURE — 99285 EMERGENCY DEPT VISIT HI MDM: CPT | Performed by: EMERGENCY MEDICINE

## 2024-01-19 PROCEDURE — 80053 COMPREHEN METABOLIC PANEL: CPT | Performed by: EMERGENCY MEDICINE

## 2024-01-19 PROCEDURE — 96374 THER/PROPH/DIAG INJ IV PUSH: CPT

## 2024-01-19 PROCEDURE — 96372 THER/PROPH/DIAG INJ SC/IM: CPT

## 2024-01-19 PROCEDURE — 36415 COLL VENOUS BLD VENIPUNCTURE: CPT | Performed by: EMERGENCY MEDICINE

## 2024-01-19 PROCEDURE — 96375 TX/PRO/DX INJ NEW DRUG ADDON: CPT

## 2024-01-19 PROCEDURE — 96361 HYDRATE IV INFUSION ADD-ON: CPT

## 2024-01-19 PROCEDURE — 85027 COMPLETE CBC AUTOMATED: CPT | Performed by: EMERGENCY MEDICINE

## 2024-01-19 PROCEDURE — 93005 ELECTROCARDIOGRAM TRACING: CPT

## 2024-01-19 RX ORDER — KETOROLAC TROMETHAMINE 30 MG/ML
30 INJECTION, SOLUTION INTRAMUSCULAR; INTRAVENOUS ONCE
Status: COMPLETED | OUTPATIENT
Start: 2024-01-19 | End: 2024-01-19

## 2024-01-19 RX ORDER — DROPERIDOL 2.5 MG/ML
2.5 INJECTION, SOLUTION INTRAMUSCULAR; INTRAVENOUS ONCE
Status: COMPLETED | OUTPATIENT
Start: 2024-01-19 | End: 2024-01-19

## 2024-01-19 RX ORDER — OSELTAMIVIR PHOSPHATE 75 MG/1
75 CAPSULE ORAL EVERY 12 HOURS SCHEDULED
Qty: 4 CAPSULE | Refills: 0 | Status: SHIPPED | OUTPATIENT
Start: 2024-01-19 | End: 2024-01-21

## 2024-01-19 RX ORDER — CIPROFLOXACIN 500 MG/1
500 TABLET, FILM COATED ORAL EVERY 12 HOURS SCHEDULED
Qty: 16 TABLET | Refills: 0 | Status: SHIPPED | OUTPATIENT
Start: 2024-01-19 | End: 2024-01-27

## 2024-01-19 RX ORDER — ONDANSETRON 2 MG/ML
4 INJECTION INTRAMUSCULAR; INTRAVENOUS ONCE
Status: COMPLETED | OUTPATIENT
Start: 2024-01-19 | End: 2024-01-19

## 2024-01-19 RX ORDER — POTASSIUM CHLORIDE 20 MEQ/1
20 TABLET, EXTENDED RELEASE ORAL ONCE
Status: COMPLETED | OUTPATIENT
Start: 2024-01-19 | End: 2024-01-19

## 2024-01-19 RX ORDER — METRONIDAZOLE 500 MG/1
500 TABLET ORAL EVERY 8 HOURS SCHEDULED
Qty: 24 TABLET | Refills: 0 | Status: SHIPPED | OUTPATIENT
Start: 2024-01-19 | End: 2024-01-27

## 2024-01-19 RX ORDER — DIPHENHYDRAMINE HYDROCHLORIDE 50 MG/ML
25 INJECTION INTRAMUSCULAR; INTRAVENOUS ONCE
Status: COMPLETED | OUTPATIENT
Start: 2024-01-19 | End: 2024-01-19

## 2024-01-19 RX ADMIN — POTASSIUM CHLORIDE 20 MEQ: 1500 TABLET, EXTENDED RELEASE ORAL at 14:37

## 2024-01-19 RX ADMIN — KETOROLAC TROMETHAMINE 30 MG: 30 INJECTION, SOLUTION INTRAMUSCULAR at 12:13

## 2024-01-19 RX ADMIN — DIPHENHYDRAMINE HYDROCHLORIDE 25 MG: 50 INJECTION, SOLUTION INTRAMUSCULAR; INTRAVENOUS at 13:21

## 2024-01-19 RX ADMIN — DROPERIDOL 2.5 MG: 2.5 INJECTION, SOLUTION INTRAMUSCULAR; INTRAVENOUS at 13:22

## 2024-01-19 RX ADMIN — ONDANSETRON 4 MG: 2 INJECTION INTRAMUSCULAR; INTRAVENOUS at 12:13

## 2024-01-19 RX ADMIN — SODIUM CHLORIDE 2000 ML: 0.9 INJECTION, SOLUTION INTRAVENOUS at 12:14

## 2024-01-19 NOTE — ED NOTES
"Patient ambulatory to the bathroom, states \"I have to shit, I can't wait\", will give IVF/meds upon his return     Mimi Vaz RN  01/19/24 1114    "

## 2024-01-19 NOTE — ED NOTES
Patient sleeping, has not vomited since medication administration     Mimi Vaz, RN  01/19/24 2274

## 2024-01-19 NOTE — ED NOTES
Patient has not vomited but refuses to attempt PO, requesting droperidol      Mimi Vaz RN  01/19/24 7299

## 2024-01-20 NOTE — ED PROVIDER NOTES
History  Chief Complaint   Patient presents with    Flu Symptoms     Patient arrives via EMS, per EMS patient has been seen 4 times this week for flu like symptoms. D/c from inpatient unit yesterday, states he has not been able to  his prescriptions     This is a 33-year-old male who presents emergency department complaining of nausea and vomiting.  Patient states he was seen in the hospital and admitted for colitis and flu.  He states he has been unable to tolerate p.o. at home.  He has filled his prescriptions for medication.  He denies fevers at home.  He denies difficulty having bowel movements.  He denies difficulty with urination.  He denies rash.  He denies chest pain or trouble breathing.        Prior to Admission Medications   Prescriptions Last Dose Informant Patient Reported? Taking?   ciprofloxacin (CIPRO) 500 mg tablet   No No   Sig: Take 1 tablet (500 mg total) by mouth every 12 (twelve) hours for 8 days   metroNIDAZOLE (FLAGYL) 500 mg tablet   No No   Sig: Take 1 tablet (500 mg total) by mouth every 8 (eight) hours for 8 days   oseltamivir (TAMIFLU) 75 mg capsule   No No   Sig: Take 1 capsule (75 mg total) by mouth every 12 (twelve) hours for 2 days      Facility-Administered Medications: None       Past Medical History:   Diagnosis Date    Asthma     Bipolar disorder (HCC)        Past Surgical History:   Procedure Laterality Date    APPENDECTOMY      TONSILLECTOMY         History reviewed. No pertinent family history.  I have reviewed and agree with the history as documented.    E-Cigarette/Vaping    E-Cigarette Use Current Every Day User      E-Cigarette/Vaping Substances     Social History     Tobacco Use    Smoking status: Every Day     Current packs/day: 0.25     Types: Cigarettes    Smokeless tobacco: Never   Vaping Use    Vaping status: Every Day   Substance Use Topics    Alcohol use: Yes    Drug use: Yes     Types: Marijuana       Review of Systems   All other systems reviewed and are  negative.      Physical Exam  Physical Exam  Constitutional:  Vital signs reviewed, retching in the room  Eyes: Pupils equal round reactive to light and accommodation, extraocular muscles intact  HEENT: trachea midline, no JVD, moist mucous membranes  Respiratory: lung sounds clear throughout, no rhonchi, no rales  Cardiovascular: regular rate rhythm, no murmurs or rubs  Abdomen: soft, mild diffuse tenderness, nondistended, no rebound or guarding  Back: no CVA tenderness, normal inspection  Extremities: no edema, pulses equal in all 4 extremities  Neuro: awake, alert, oriented, no focal weakness  Skin: warm, dry, intact, no rashes noted    Vital Signs  ED Triage Vitals [01/19/24 1053]   Temperature Pulse Respirations Blood Pressure SpO2   97.8 °F (36.6 °C) 66 18 134/86 98 %      Temp Source Heart Rate Source Patient Position - Orthostatic VS BP Location FiO2 (%)   Oral Monitor Sitting Left arm --      Pain Score       9           Vitals:    01/19/24 1053 01/19/24 1215 01/19/24 1300 01/19/24 1532   BP: 134/86 131/82 127/89 125/68   Pulse: 66 74 66 60   Patient Position - Orthostatic VS: Sitting            Visual Acuity      ED Medications  Medications   sodium chloride 0.9 % bolus 2,000 mL (0 mL Intravenous Stopped 1/19/24 1410)   ketorolac (TORADOL) injection 30 mg (30 mg Intravenous Given 1/19/24 1213)   ondansetron (ZOFRAN) injection 4 mg (4 mg Intravenous Given 1/19/24 1213)   diphenhydrAMINE (BENADRYL) injection 25 mg (25 mg Intravenous Given 1/19/24 1321)   droperidol (INAPSINE) injection 2.5 mg (2.5 mg Intramuscular Given 1/19/24 1322)   potassium chloride (K-DUR,KLOR-CON) CR tablet 20 mEq (20 mEq Oral Given 1/19/24 1437)       Diagnostic Studies  Results Reviewed       Procedure Component Value Units Date/Time    Comprehensive metabolic panel [[id number]]  (Abnormal) Collected: 01/19/24 1145    Lab Status: Final result Specimen: Blood Updated: 01/19/24 1212     Sodium 141 mmol/L      Potassium 2.9 mmol/L       Chloride 104 mmol/L      CO2 24 mmol/L      ANION GAP 13 mmol/L      BUN 4 mg/dL      Creatinine 0.83 mg/dL      Glucose 109 mg/dL      Calcium 9.5 mg/dL      AST 29 U/L      ALT 28 U/L      Alkaline Phosphatase 43 U/L      Total Protein 7.4 g/dL      Albumin 4.0 g/dL      Total Bilirubin 0.86 mg/dL      eGFR 117 ml/min/1.73sq m     Narrative:      National Kid[employer] guidelines for Chronic Kidney Disease (CKD):     Stage 1 with normal or high GFR (GFR > 90 mL/min/1.73 square meters)    Stage 2 Mild CKD (GFR = 60-89 mL/min/1.73 square meters)    Stage 3A Moderate CKD (GFR = 45-59 mL/min/1.73 square meters)    Stage 3B Moderate CKD (GFR = 30-44 mL/min/1.73 square meters)    Stage 4 Severe CKD (GFR = 15-29 mL/min/1.73 square meters)    Stage 5 End Stage CKD (GFR <15 mL/min/1.73 square meters)  Note: GFR calculation is accurate only with a steady state creatinine    CBC and differential [[id number]]  (Abnormal) Collected: 01/19/24 1145    Lab Status: Final result Specimen: Blood Updated: 01/19/24 1157     WBC 6.22 Thousand/uL      RBC 5.10 Million/uL      Hemoglobin 15.9 g/dL      Hematocrit 42.5 %      MCV 83 fL      MCH 31.2 pg      MCHC 37.4 g/dL      RDW 10.9 %      MPV 9.3 fL      Platelets 263 Thousands/uL     Narrative:      See Manual Diff Done Within 3 Days  This is an appended report.  These results have been appended to a previously verified report.                   No orders to display              Procedures  ECG 12 Lead Documentation Only    Date/Time: 1/19/2024 7:12 PM    Performed by: Hang Hui DO  Authorized by: Hang Hui DO    ECG reviewed by me, the ED Provider: yes    Patient location:  ED  Comments:      Normal sinus rhythm, rate 61, normal OR, normal QTc, no STEMI, EKG independently interpreted by me, normal sinus rhythm compared to sinus bradycardia from EKG dated 1/17/2024           ED Course  ED Course as of 01/19/24 2020 Fri Jan 19, 2024   1319 The patient was  reevaluated.  He states he feels mildly better.  Will give droperidol and Benadryl.   2018 The patient was re-evaluated again. He was able to tolerate PO. He was had labs that were significant for a K of 2.9. It was replaced with oral K. He was feeling better and discharged with follow up to his PCP.                                              Medical Decision Making  This is a 33-year-old male who presented to the emergency department complaining of nausea and vomiting.  I considered pancreatitis, influenza, cannabis hyperemesis syndrome. These and other diagnoses were considered.         Amount and/or Complexity of Data Reviewed  External Data Reviewed: notes.  Labs: ordered. Decision-making details documented in ED Course.    Risk  Prescription drug management.             Disposition  Final diagnoses:   Influenza   Nausea and vomiting   Cannabis hyperemesis syndrome concurrent with and due to cannabis abuse (HCC)     Time reflects when diagnosis was documented in both MDM as applicable and the Disposition within this note       Time User Action Codes Description Comment    1/19/2024  1:06 PM Hang Hui [J11.1] Influenza     1/19/2024  1:06 PM Hang Hui Add [R11.2] Nausea and vomiting     1/19/2024  1:06 PM Hang Hui Add [F12.188] Cannabis hyperemesis syndrome concurrent with and due to cannabis abuse (HCC)           ED Disposition       ED Disposition   Discharge    Condition   Stable    Date/Time   Fri Jan 19, 2024  1:05 PM    Comment   Edward L Reyes discharge to home/self care.                   Follow-up Information       Follow up With Specialties Details Why Contact Info Additional Information    [facility] Emergency Department Emergency Medicine  If symptoms worsen [address] [address]  [phone] [facility] Emergency Department, [address] [address]    [facility]  Schedule an appointment as soon as possible for a visit in 2 days  [address] [place] [id number] [phone] [facility], [address] [place] [address], [id number] [phone]            Discharge Medication List as of 1/19/2024  2:52 PM        CONTINUE these medications which have NOT CHANGED    Details   ciprofloxacin (CIPRO) 500 mg tablet Take 1 tablet (500 mg total) by mouth every 12 (twelve) hours for 8 days, Starting Fri 1/19/2024, Until Sat 1/27/2024, Normal      metroNIDAZOLE (FLAGYL) 500 mg tablet Take 1 tablet (500 mg total) by mouth every 8 (eight) hours for 8 days, Starting Fri 1/19/2024, Until Sat 1/27/2024, Normal      oseltamivir (TAMIFLU) 75 mg capsule Take 1 capsule (75 mg total) by mouth every 12 (twelve) hours for 2 days, Starting Fri 1/19/2024, Until Sun 1/21/2024, Normal             No discharge procedures on file.    PDMP Review       None            ED Provider  Electronically Signed by             Hang Hui DO  01/19/24 2020

## 2024-01-22 LAB
ATRIAL RATE: 61 BPM
P AXIS: 60 DEGREES
PR INTERVAL: 158 MS
QRS AXIS: 91 DEGREES
QRSD INTERVAL: 98 MS
QT INTERVAL: 432 MS
QTC INTERVAL: 434 MS
T WAVE AXIS: 50 DEGREES
VENTRICULAR RATE: 61 BPM

## 2024-01-22 NOTE — QUICK NOTE
[facility]  Order entered on 1/17/2024 at 1014 was entered in error, correct patient class is  observation from time of admission.  Thanks

## 2024-01-26 ENCOUNTER — TELEPHONE (OUTPATIENT)
Dept: GASTROENTEROLOGY | Facility: CLINIC | Age: 34
End: 2024-01-26

## 2024-01-26 ENCOUNTER — CONSULT (OUTPATIENT)
Dept: GASTROENTEROLOGY | Facility: CLINIC | Age: 34
End: 2024-01-26
Payer: COMMERCIAL

## 2024-01-26 VITALS
WEIGHT: 142.6 LBS | SYSTOLIC BLOOD PRESSURE: 115 MMHG | HEART RATE: 95 BPM | HEIGHT: 68 IN | BODY MASS INDEX: 21.61 KG/M2 | DIASTOLIC BLOOD PRESSURE: 85 MMHG

## 2024-01-26 DIAGNOSIS — K52.9 COLITIS: ICD-10-CM

## 2024-01-26 DIAGNOSIS — R11.2 NAUSEA AND VOMITING: Primary | ICD-10-CM

## 2024-01-26 DIAGNOSIS — R10.13 EPIGASTRIC PAIN: ICD-10-CM

## 2024-01-26 PROCEDURE — 99213 OFFICE O/P EST LOW 20 MIN: CPT | Performed by: NURSE PRACTITIONER

## 2024-01-26 RX ORDER — FAMOTIDINE 20 MG/1
20 TABLET, FILM COATED ORAL 2 TIMES DAILY
Qty: 60 TABLET | Refills: 0 | Status: SHIPPED | OUTPATIENT
Start: 2024-01-26

## 2024-01-26 NOTE — TELEPHONE ENCOUNTER
Scheduled date of EGD/colonoscopy (as of today):3/7/24  Physician performing EGD/colonoscopy:Dr Galvan  Location of EGD/colonoscopy:[facility]  Desired bowel prep reviewed with patient:miralax   Instructions reviewed with patient by:sb  Clearances:  none

## 2024-01-26 NOTE — PROGRESS NOTES
[facility] [place] - Outpatient Consultation  Edward L Reyes 33 y.o. male MRN: [id number]  Encounter: [id number]          ASSESSMENT AND PLAN:      1. Nausea and vomiting  2. Epigastric pain  Patient with history of several ED visits for intractable nausea and vomiting.  He reports cyclical episodes starting first in January 2022, another episode in August 2022, and then in the beginning of this month associated with the flu a infection.  He has no symptoms between episodes and reports that sitting in a hot tub helps calm his symptoms sometimes, although normally he has to present to the ED due to dehydration.  Prior CT scan in August noted mild esophageal wall edema which could indicate esophagitis and gastric mucosal enhancement which could indicate gastritis.  He has a history of heavy marijuana use, but quit 8 days ago.  Reports he has been struggling with a poor appetite and feeling of fullness/heaviness after eating.  He has some epigastric discomfort on exam.  Symptoms may be multifactorial secondary to cannabis hyperemesis syndrome, gastritis, H. pylori infection, PUD.    -Continue marijuana cessation.  Advised patient that it will take months of cessation to noted difference  -Start Pepcid 20 mg twice a day  -Gastritis diet handout given  -Will schedule EGD for further evaluation.  Prep and procedure explained.  Further recommendations pending course  -Contingency may include right upper quadrant ultrasound or gastric emptying study    -     Ambulatory Referral to Gastroenterology  -     famotidine (PEPCID) 20 mg tablet; Take 1 tablet (20 mg total) by mouth 2 (two) times a day  -     EGD; Future; Expected date: 01/26/2024    3. Colitis  Patient had recent CAT scan performed 1/16/2024 showing possible colitis of the right colon.  He reports he has diarrhea typically with his episodes of intractable nausea/vomiting that resolved afterwards.  He normally moves his bowels daily otherwise  and denies any right-sided abdominal pain.  Colitis may be spurious finding or related to viral infection from the flu.  Will obtain colonoscopy with EGD for further evaluation to exclude any chronic inflammation or underlying lesion.  Prep and procedure explained.  Risks/benefits discussed.  -     Colonoscopy; Future; Expected date: 01/26/2024        ______________________________________________________________________    HPI:  Edward L Reyes is a 33 y.o. male with history of bipolar disorder, asthma, marijuana use, and appendectomy who presents to establish care after several ED visit and a hospitalization earlier this month.  He has a history of cyclical vomiting with first episode occurring in January 2022 with persistent nausea/vomiting for about a week, reports another episode again in August 2022, and then again in the beginning of the month associated with a flu A infection.  He has no symptoms in between episodes and reports sitting in the hot tub helps calm his symptoms, although he normally has to present to the ED due to dehydration.  After his most recent ED visit he stopped smoking marijuana and has not smoked in 8 days.  He was noted to have possible colitis in the wall of the right colon/hepatic flexure and transverse colon on recent CT scan 1/16/2024 and has had evidence of esophagitis/gastritis on prior CAT scans.  He has never had an EGD or colonoscopy.  Reports he normally moves his bowels every day once a day with formed stool, however during episodes of nausea/vomiting he typically will have diarrhea which resolves thereafter.  He denies any rectal bleeding, family history of colon cancer or IBD.  Denies any heartburn, reflux, dysphagia.  Does have some epigastric tenderness on exam and reports a poor appetite since he stopped marijuana usage and feels like he is getting full easily and has heaviness in his stomach.  He has also lost about 20 pounds since the beginning of the month.    Vapes  "Nicotine, was smoking an ounce of marijuana every 4-5 days, drinks alcohol once every few months       REVIEW OF SYSTEMS:    CONSTITUTIONAL: Denies any fever, chills, rigors, and weight loss.  HEENT: No earache or tinnitus.  CARDIOVASCULAR: No chest pain or palpitations.   RESPIRATORY: Denies any cough, hemoptysis, shortness of breath or dyspnea on exertion.  GASTROINTESTINAL: As noted in the History of Present Illness.   GENITOURINARY: Denies any hematuria or dysuria.  NEUROLOGIC: No dizziness or vertigo.   MUSCULOSKELETAL: Denies any joint swellings.  SKIN: Denies skin rashes or itching.   ENDOCRINE: Denies excessive thirst. Denies intolerance to heat or cold.  PSYCHOSOCIAL: Denies depression or anxiety. Denies any recent memory loss.       Historical Information   Past Medical History:   Diagnosis Date    Asthma     Bipolar disorder (HCC)      Past Surgical History:   Procedure Laterality Date    APPENDECTOMY      TONSILLECTOMY       Social History   Social History     Substance and Sexual Activity   Alcohol Use Yes    Comment: rarely     Social History     Substance and Sexual Activity   Drug Use Not Currently    Types: Marijuana     Social History     Tobacco Use   Smoking Status Every Day    Current packs/day: 0.25    Types: Cigarettes   Smokeless Tobacco Never     History reviewed. No pertinent family history.    Meds/Allergies       Current Outpatient Medications:     famotidine (PEPCID) 20 mg tablet    ciprofloxacin (CIPRO) 500 mg tablet    metroNIDAZOLE (FLAGYL) 500 mg tablet    No Known Allergies        Objective     Blood pressure 115/85, pulse 95, height 5' 8\" (1.727 m), weight 64.7 kg (142 lb 9.6 oz). Body mass index is 21.68 kg/m².        PHYSICAL EXAM:      General Appearance:   Alert, cooperative, no distress   HEENT:   Normocephalic, atraumatic, anicteric.     Neck:  Supple, symmetrical, trachea midline   Lungs:   Clear to auscultation bilaterally; no rales, rhonchi or wheezing; respirations " unlabored    Heart::   Regular rate and rhythm; no murmur.   Abdomen:   Soft, left upper quadrant tender, non-distended; normal bowel sounds; no masses, no organomegaly    Genitalia:   Deferred    Rectal:   Deferred    Extremities:  No cyanosis, clubbing or edema    Skin:  No jaundice, rashes, or lesions    Lymph nodes:  No palpable cervical lymphadenopathy        Lab Results:   No visits with results within 1 Day(s) from this visit.   Latest known visit with results is:   Admission on 01/19/2024, Discharged on 01/19/2024   Component Date Value    WBC 01/19/2024 6.22     RBC 01/19/2024 5.10     Hemoglobin 01/19/2024 15.9     Hematocrit 01/19/2024 42.5     MCV 01/19/2024 83     MCH 01/19/2024 31.2     MCHC 01/19/2024 37.4     RDW 01/19/2024 10.9 (L)     MPV 01/19/2024 9.3     Platelets 01/19/2024 263     Sodium 01/19/2024 141     Potassium 01/19/2024 2.9 (L)     Chloride 01/19/2024 104     CO2 01/19/2024 24     ANION GAP 01/19/2024 13     BUN 01/19/2024 4 (L)     Creatinine 01/19/2024 0.83     Glucose 01/19/2024 109     Calcium 01/19/2024 9.5     AST 01/19/2024 29     ALT 01/19/2024 28     Alkaline Phosphatase 01/19/2024 43     Total Protein 01/19/2024 7.4     Albumin 01/19/2024 4.0     Total Bilirubin 01/19/2024 0.86     eGFR 01/19/2024 117     Ventricular Rate 01/19/2024 61     Atrial Rate 01/19/2024 61     CA Interval 01/19/2024 158     QRSD Interval 01/19/2024 98     QT Interval 01/19/2024 432     QTC Interval 01/19/2024 434     P Axis 01/19/2024 60     QRS Axis 01/19/2024 91     T Wave Axis 01/19/2024 50          Radiology Results:   CT abdomen pelvis with contrast    Result Date: 1/16/2024  Narrative: CT ABDOMEN AND PELVIS WITH IV CONTRAST INDICATION: Nausea, vomiting, generalized abdominal discomfort. Recently diagnosed with Influenza A. Prior history of appendectomy. COMPARISON: January 13, 2022. TECHNIQUE: CT examination of the abdomen and pelvis was performed. Multiplanar 2D reformatted images were created  from the source data. This examination, like all CT scans performed in the [facility], was performed utilizing techniques to minimize radiation dose exposure, including the use of iterative reconstruction and automated exposure control. Radiation dose length product (DLP) for this visit: 358.2 mGy-cm IV Contrast: 100 mL of iohexol (OMNIPAQUE) Enteric Contrast: Enteric contrast was not administered. FINDINGS: ABDOMEN LOWER CHEST: No clinically significant abnormality identified in the visualized lower chest. LIVER/BILIARY TREE: The right lobe of the liver is elongated, similar to the prior study. GALLBLADDER: No calcified gallstones. No pericholecystic inflammatory change. SPLEEN: The spleen is borderline enlarged, measuring approximately 13 cm. PANCREAS: Unremarkable. ADRENAL GLANDS: Unremarkable. KIDNEYS/URETERS: Unremarkable. No hydronephrosis. STOMACH AND BOWEL: The wall of the right colon, hepatic flexure and transverse colon appears somewhat thickened, however, evaluation is limited by under distention. No bowel obstruction. APPENDIX: There are expected postoperative changes of appendectomy. ABDOMINOPELVIC CAVITY: There is a small amount of free fluid, best demonstrated layering posteriorly in the pelvis. No pneumoperitoneum. VESSELS: Unremarkable for patient's age. PELVIS REPRODUCTIVE ORGANS: Unremarkable for patient's age. URINARY BLADDER: Unremarkable. ABDOMINAL WALL/INGUINAL REGIONS: Unremarkable. OSSEOUS STRUCTURES: No acute fracture or destructive osseous lesion.     Impression: Possible colitis, as described above. Please see discussion. Clinical correlation recommended. No bowel obstruction. Prior appendectomy. Borderline splenomegaly. Workstation performed: [id number]    (2) good, crying

## 2024-01-26 NOTE — PATIENT INSTRUCTIONS
Diet for Stomach Ulcers and Gastritis   WHAT YOU NEED TO KNOW:   A diet for stomach ulcers and gastritis is a meal plan that limits foods that irritate your stomach. Certain foods may worsen symptoms such as stomach pain, bloating, heartburn, or indigestion.  DISCHARGE INSTRUCTIONS:   Foods to limit or avoid:  You may need to avoid acidic, spicy, or high-fat foods. Not all foods affect everyone the same way. You will need to learn which foods worsen your symptoms and limit those foods. The following are some foods that may worsen ulcer or gastritis symptoms:  Beverages:      Whole milk and chocolate milk    Hot cocoa and cola    Any beverage with caffeine    Regular and decaffeinated coffee    Peppermint and spearmint tea    Green and black tea, with or without caffeine    Orange and grapefruit juices    Drinks that contain alcohol    Spices and seasonings:      Black and red pepper    Chili powder    Mustard seed and nutmeg    Other foods:      Dairy foods made from whole milk or cream    Chocolate    Spicy or strongly flavored cheeses, such as jalapeno or black pepper    Highly seasoned, high-fat meats, such as sausage, salami, hamilton, ham, and cold cuts    Hot chiles and peppers    Tomato products, such as tomato paste, tomato sauce, or tomato juice    Foods to include:  Eat a variety of healthy foods from all the food groups. Eat fruits, vegetables, whole grains, and fat-free or low-fat dairy foods. Whole grains include whole-wheat breads, cereals, pasta, and brown rice. Choose lean meats, poultry (chicken and turkey), fish, beans, eggs, and nuts. A healthy meal plan is low in unhealthy fats, salt, and added sugar. Healthy fats include olive oil and canola oil. Ask your dietitian for more information about a healthy diet.       Other helpful guidelines:   Do not eat right before bedtime.  Stop eating at least 2 hours before bedtime.    Eat small, frequent meals.  Your stomach may tolerate small, frequent meals  better than large meals.    © Copyright [employer] 2023 Information is for End User's use only and may not be sold, redistributed or otherwise used for commercial purposes.  The above information is an  only. It is not intended as medical advice for individual conditions or treatments. Talk to your doctor, nurse or pharmacist before following any medical regimen to see if it is safe and effective for you.

## 2024-02-09 DIAGNOSIS — R10.13 EPIGASTRIC PAIN: ICD-10-CM

## 2024-02-09 DIAGNOSIS — R11.2 NAUSEA AND VOMITING: ICD-10-CM

## 2024-02-09 RX ORDER — FAMOTIDINE 20 MG/1
20 TABLET, FILM COATED ORAL 2 TIMES DAILY
Qty: 180 TABLET | Refills: 1 | Status: SHIPPED | OUTPATIENT
Start: 2024-02-09

## 2024-02-21 PROBLEM — J10.1 INFLUENZA A: Status: RESOLVED | Noted: 2024-01-16 | Resolved: 2024-02-21

## 2024-02-22 ENCOUNTER — ANESTHESIA (OUTPATIENT)
Dept: ANESTHESIOLOGY | Facility: AMBULATORY SURGERY CENTER | Age: 34
End: 2024-02-22

## 2024-02-22 ENCOUNTER — ANESTHESIA EVENT (OUTPATIENT)
Dept: ANESTHESIOLOGY | Facility: AMBULATORY SURGERY CENTER | Age: 34
End: 2024-02-22

## 2024-02-29 ENCOUNTER — TELEPHONE (OUTPATIENT)
Dept: GASTROENTEROLOGY | Facility: CLINIC | Age: 34
End: 2024-02-29

## 2024-02-29 NOTE — TELEPHONE ENCOUNTER
I spoke with pt  confirming pt's colonoscopy  and egd scheduled on 3/7/24 at [facility] with Dr Galvan .  Informed [facility] would be calling this pt with the arrival time.  Informed of clear liquid diet day prior as well as the bowel cleansing preparation.  Informed would need a  the day of the procedure due to being under sedation. I asked pt to please call back if has not received instructions or if has any questions.

## 2024-03-07 ENCOUNTER — ANESTHESIA EVENT (OUTPATIENT)
Dept: GASTROENTEROLOGY | Facility: AMBULATORY SURGERY CENTER | Age: 34
End: 2024-03-07

## 2024-03-07 ENCOUNTER — HOSPITAL ENCOUNTER (OUTPATIENT)
Dept: GASTROENTEROLOGY | Facility: AMBULATORY SURGERY CENTER | Age: 34
Discharge: HOME | End: 2024-03-07
Payer: COMMERCIAL

## 2024-03-07 ENCOUNTER — ANESTHESIA (OUTPATIENT)
Dept: GASTROENTEROLOGY | Facility: AMBULATORY SURGERY CENTER | Age: 34
End: 2024-03-07

## 2024-03-07 VITALS
OXYGEN SATURATION: 100 % | HEIGHT: 69 IN | SYSTOLIC BLOOD PRESSURE: 99 MMHG | BODY MASS INDEX: 22.96 KG/M2 | TEMPERATURE: 97.3 F | DIASTOLIC BLOOD PRESSURE: 73 MMHG | WEIGHT: 155 LBS | HEART RATE: 86 BPM | RESPIRATION RATE: 18 BRPM

## 2024-03-07 DIAGNOSIS — R11.2 NAUSEA AND VOMITING: ICD-10-CM

## 2024-03-07 DIAGNOSIS — K52.9 COLITIS: ICD-10-CM

## 2024-03-07 DIAGNOSIS — R10.13 EPIGASTRIC PAIN: ICD-10-CM

## 2024-03-07 PROBLEM — J45.909 ASTHMA: Status: ACTIVE | Noted: 2024-03-07

## 2024-03-07 PROBLEM — F43.10 PTSD (POST-TRAUMATIC STRESS DISORDER): Status: ACTIVE | Noted: 2022-03-02

## 2024-03-07 PROBLEM — F31.4: Status: ACTIVE | Noted: 2022-03-02

## 2024-03-07 PROBLEM — F41.1 GENERALIZED ANXIETY DISORDER: Status: ACTIVE | Noted: 2022-03-02

## 2024-03-07 PROCEDURE — 43239 EGD BIOPSY SINGLE/MULTIPLE: CPT | Performed by: INTERNAL MEDICINE

## 2024-03-07 PROCEDURE — 45378 DIAGNOSTIC COLONOSCOPY: CPT | Performed by: INTERNAL MEDICINE

## 2024-03-07 PROCEDURE — 88305 TISSUE EXAM BY PATHOLOGIST: CPT | Performed by: PATHOLOGY

## 2024-03-07 RX ORDER — PROPOFOL 10 MG/ML
INJECTION, EMULSION INTRAVENOUS AS NEEDED
Status: DISCONTINUED | OUTPATIENT
Start: 2024-03-07 | End: 2024-03-07

## 2024-03-07 RX ORDER — SODIUM CHLORIDE, SODIUM LACTATE, POTASSIUM CHLORIDE, CALCIUM CHLORIDE 600; 310; 30; 20 MG/100ML; MG/100ML; MG/100ML; MG/100ML
50 INJECTION, SOLUTION INTRAVENOUS CONTINUOUS
Status: DISCONTINUED | OUTPATIENT
Start: 2024-03-07 | End: 2024-03-11 | Stop reason: HOSPADM

## 2024-03-07 RX ORDER — LIDOCAINE HYDROCHLORIDE 20 MG/ML
INJECTION, SOLUTION EPIDURAL; INFILTRATION; INTRACAUDAL; PERINEURAL AS NEEDED
Status: DISCONTINUED | OUTPATIENT
Start: 2024-03-07 | End: 2024-03-07

## 2024-03-07 RX ADMIN — LIDOCAINE HYDROCHLORIDE 100 MG: 20 INJECTION, SOLUTION EPIDURAL; INFILTRATION; INTRACAUDAL; PERINEURAL at 12:48

## 2024-03-07 RX ADMIN — PROPOFOL 30 MG: 10 INJECTION, EMULSION INTRAVENOUS at 13:01

## 2024-03-07 RX ADMIN — PROPOFOL 20 MG: 10 INJECTION, EMULSION INTRAVENOUS at 13:04

## 2024-03-07 RX ADMIN — SODIUM CHLORIDE, SODIUM LACTATE, POTASSIUM CHLORIDE, CALCIUM CHLORIDE: 600; 310; 30; 20 INJECTION, SOLUTION INTRAVENOUS at 12:07

## 2024-03-07 RX ADMIN — PROPOFOL 30 MG: 10 INJECTION, EMULSION INTRAVENOUS at 12:53

## 2024-03-07 RX ADMIN — PROPOFOL 30 MG: 10 INJECTION, EMULSION INTRAVENOUS at 12:57

## 2024-03-07 RX ADMIN — PROPOFOL 150 MG: 10 INJECTION, EMULSION INTRAVENOUS at 12:48

## 2024-03-07 RX ADMIN — PROPOFOL 50 MG: 10 INJECTION, EMULSION INTRAVENOUS at 12:51

## 2024-03-07 RX ADMIN — PROPOFOL 20 MG: 10 INJECTION, EMULSION INTRAVENOUS at 12:55

## 2024-03-07 NOTE — ANESTHESIA POSTPROCEDURE EVALUATION
Post-Op Assessment Note    CV Status:  Stable  Pain Score: 0    Pain management: adequate       Mental Status:  Sleepy and arousable   Hydration Status:  Euvolemic   PONV Controlled:  Controlled   Airway Patency:  Patent     Post Op Vitals Reviewed: Yes    No anethesia notable event occurred.    Staff: Anesthesiologist, CRNA               BP 99/54 (03/07/24 1312)    Temp      Pulse 87 (03/07/24 1312)   Resp 16 (03/07/24 1312)    SpO2 96 % (03/07/24 1312)

## 2024-03-07 NOTE — H&P
"History and Physical -  [facility]  Edward L Reyes 33 y.o. male MRN: [id number]                  HPI: Edward L Reyes is a 33 y.o. year old male who presents for nausea, vomiting, abdominal pain, colitis      REVIEW OF SYSTEMS: Per the HPI, and otherwise unremarkable.    Historical Information   Past Medical History:   Diagnosis Date    Anxiety     Asthma     Asthma     history of -last attack was age 15    Bipolar disorder (HCC)     Depression     History of vomiting     patient states due to cannabis use-no current problems 3/7/24     Past Surgical History:   Procedure Laterality Date    APPENDECTOMY      TONSILLECTOMY       Social History   Social History     Substance and Sexual Activity   Alcohol Use Yes    Comment: rarely     Social History     Substance and Sexual Activity   Drug Use Not Currently    Types: Marijuana     Social History     Tobacco Use   Smoking Status Never   Smokeless Tobacco Never     History reviewed. No pertinent family history.    Meds/Allergies       Current Outpatient Medications:     famotidine (PEPCID) 20 mg tablet    Current Facility-Administered Medications:     lactated ringers infusion, 50 mL/hr, Intravenous, Continuous, New Bag at 03/07/24 1207    No Known Allergies    Objective     /80   Pulse 98   Temp (!) 97.3 °F (36.3 °C) (Temporal)   Resp 18   Ht 5' 8.5\" (1.74 m)   Wt 70.3 kg (155 lb)   SpO2 99%   BMI 23.22 kg/m²       PHYSICAL EXAM    Gen: NAD  Head: NCAT  CV: RRR  CHEST: Clear  ABD: soft, NT/ND  EXT: no edema      ASSESSMENT/PLAN:  This is a 33 y.o. year old male here for EGD, colonoscopy, and he is stable and optimized for his procedure.        "

## 2024-03-07 NOTE — ANESTHESIA PREPROCEDURE EVALUATION
Procedure:  COLONOSCOPY  EGD    Relevant Problems   NEURO/PSYCH   (+) Generalized anxiety disorder   (+) PTSD (post-traumatic stress disorder)      PULMONARY   (+) Asthma      Other   (+) Severe bipolar I disorder, current or most recent episode depressed, with mixed features (HCC)        Physical Exam    Airway    Mallampati score: II  TM Distance: >3 FB  Neck ROM: full     Dental       Cardiovascular      Pulmonary      Other Findings        Anesthesia Plan  ASA Score- 2     Anesthesia Type- IV sedation with anesthesia with ASA Monitors.         Additional Monitors:     Airway Plan:            Plan Factors-Exercise tolerance (METS): >4 METS.    Chart reviewed. EKG reviewed.  Existing labs reviewed. Patient summary reviewed.                  Induction- intravenous.    Postoperative Plan-     Informed Consent- Anesthetic plan and risks discussed with patient.  I personally reviewed this patient with the CRNA. Discussed and agreed on the Anesthesia Plan with the CRNA..

## 2024-03-29 ENCOUNTER — TELEPHONE (OUTPATIENT)
Age: 34
End: 2024-03-29

## 2024-03-29 NOTE — TELEPHONE ENCOUNTER
Returned patient to call to review results.  Patient did not answer left message for patient to call back.

## 2024-03-29 NOTE — TELEPHONE ENCOUNTER
Patients GI provider:  Dr. Galvan    Number to return call: [phone]    Reason for call: Pt returning call about results of EGD/Colon.  Advised of doctor's message. Please send medications to [facility] in Chart and return patient's call to answer questions.    Scheduled procedure/appointment date if applicable: 3/7/24

## 2024-04-01 ENCOUNTER — TELEPHONE (OUTPATIENT)
Dept: FAMILY MEDICINE CLINIC | Facility: CLINIC | Age: 34
End: 2024-04-01

## 2024-04-01 DIAGNOSIS — A04.8 HELICOBACTER PYLORI INFECTION: Primary | ICD-10-CM

## 2024-04-01 RX ORDER — TETRACYCLINE HYDROCHLORIDE 500 MG/1
CAPSULE ORAL
Qty: 56 CAPSULE | Refills: 0 | Status: SHIPPED | OUTPATIENT
Start: 2024-04-01 | End: 2024-04-15

## 2024-04-01 RX ORDER — METRONIDAZOLE 250 MG/1
TABLET ORAL
Qty: 56 TABLET | Refills: 0 | Status: SHIPPED | OUTPATIENT
Start: 2024-04-01 | End: 2024-04-15

## 2024-04-01 RX ORDER — PANTOPRAZOLE SODIUM 40 MG/1
TABLET, DELAYED RELEASE ORAL
Qty: 28 TABLET | Refills: 0 | Status: SHIPPED | OUTPATIENT
Start: 2024-04-01

## 2024-04-01 RX ORDER — BISMUTH SUBSALICYLATE 262 MG/1
TABLET, CHEWABLE ORAL
Qty: 56 TABLET | Refills: 0 | Status: SHIPPED | OUTPATIENT
Start: 2024-04-01

## 2024-04-01 NOTE — TELEPHONE ENCOUNTER
Patient with H-Pylori.He states he does not take Pepcid.Please send Quad therapy to the pharmacy.Thank you!

## 2024-04-18 ENCOUNTER — TELEPHONE (OUTPATIENT)
Age: 34
End: 2024-04-18

## 2024-04-18 NOTE — TELEPHONE ENCOUNTER
Patients GI provider:  Dr. Vickers    Number to return call: [phone]    Reason for call: Kia Jay from [facility] called for clinical update on pt. I gave her fax number and she will fax the request for the reports she needs.     Scheduled procedure/appointment date if applicable: Apt/procedure N/A

## 2024-04-23 ENCOUNTER — TRANSCRIBE ORDERS (OUTPATIENT)
Dept: GASTROENTEROLOGY | Facility: CLINIC | Age: 34
End: 2024-04-23